# Patient Record
Sex: FEMALE | Race: BLACK OR AFRICAN AMERICAN | NOT HISPANIC OR LATINO | Employment: UNEMPLOYED | URBAN - METROPOLITAN AREA
[De-identification: names, ages, dates, MRNs, and addresses within clinical notes are randomized per-mention and may not be internally consistent; named-entity substitution may affect disease eponyms.]

---

## 2022-03-10 ENCOUNTER — OFFICE VISIT (OUTPATIENT)
Dept: PRIMARY CARE CLINIC | Facility: CLINIC | Age: 42
End: 2022-03-10
Payer: MEDICAID

## 2022-03-10 ENCOUNTER — LAB VISIT (OUTPATIENT)
Dept: PRIMARY CARE CLINIC | Facility: CLINIC | Age: 42
End: 2022-03-10
Payer: MEDICAID

## 2022-03-10 VITALS
HEIGHT: 65 IN | SYSTOLIC BLOOD PRESSURE: 136 MMHG | WEIGHT: 293 LBS | HEART RATE: 97 BPM | DIASTOLIC BLOOD PRESSURE: 60 MMHG | BODY MASS INDEX: 48.82 KG/M2

## 2022-03-10 DIAGNOSIS — E66.01 CLASS 3 SEVERE OBESITY WITH BODY MASS INDEX (BMI) OF 50.0 TO 59.9 IN ADULT, UNSPECIFIED OBESITY TYPE, UNSPECIFIED WHETHER SERIOUS COMORBIDITY PRESENT: ICD-10-CM

## 2022-03-10 DIAGNOSIS — Z00.00 ANNUAL PHYSICAL EXAM: ICD-10-CM

## 2022-03-10 DIAGNOSIS — M25.562 CHRONIC PAIN OF LEFT KNEE: Primary | ICD-10-CM

## 2022-03-10 DIAGNOSIS — R87.610 ASCUS OF CERVIX WITH NEGATIVE HIGH RISK HPV: ICD-10-CM

## 2022-03-10 DIAGNOSIS — N92.0 MENORRHAGIA WITH REGULAR CYCLE: ICD-10-CM

## 2022-03-10 DIAGNOSIS — Z12.31 SCREENING MAMMOGRAM, ENCOUNTER FOR: ICD-10-CM

## 2022-03-10 DIAGNOSIS — G89.29 CHRONIC PAIN OF LEFT KNEE: Primary | ICD-10-CM

## 2022-03-10 LAB
ALBUMIN SERPL BCP-MCNC: 3.3 G/DL (ref 3.5–5.2)
ALP SERPL-CCNC: 32 U/L (ref 55–135)
ALT SERPL W/O P-5'-P-CCNC: 10 U/L (ref 10–44)
ANION GAP SERPL CALC-SCNC: 6 MMOL/L (ref 8–16)
AST SERPL-CCNC: 13 U/L (ref 10–40)
BASOPHILS # BLD AUTO: 0.02 K/UL (ref 0–0.2)
BASOPHILS NFR BLD: 0.4 % (ref 0–1.9)
BILIRUB SERPL-MCNC: 0.3 MG/DL (ref 0.1–1)
BUN SERPL-MCNC: 6 MG/DL (ref 6–20)
CALCIUM SERPL-MCNC: 9.3 MG/DL (ref 8.7–10.5)
CHLORIDE SERPL-SCNC: 102 MMOL/L (ref 95–110)
CHOLEST SERPL-MCNC: 119 MG/DL (ref 120–199)
CHOLEST/HDLC SERPL: 2 {RATIO} (ref 2–5)
CO2 SERPL-SCNC: 30 MMOL/L (ref 23–29)
CREAT SERPL-MCNC: 0.8 MG/DL (ref 0.5–1.4)
DIFFERENTIAL METHOD: ABNORMAL
EOSINOPHIL # BLD AUTO: 0.1 K/UL (ref 0–0.5)
EOSINOPHIL NFR BLD: 1.1 % (ref 0–8)
ERYTHROCYTE [DISTWIDTH] IN BLOOD BY AUTOMATED COUNT: 18.6 % (ref 11.5–14.5)
EST. GFR  (AFRICAN AMERICAN): >60 ML/MIN/1.73 M^2
EST. GFR  (NON AFRICAN AMERICAN): >60 ML/MIN/1.73 M^2
ESTIMATED AVG GLUCOSE: 105 MG/DL (ref 68–131)
GLUCOSE SERPL-MCNC: 91 MG/DL (ref 70–110)
HBA1C MFR BLD: 5.3 % (ref 4–5.6)
HCT VFR BLD AUTO: 31.4 % (ref 37–48.5)
HDLC SERPL-MCNC: 61 MG/DL (ref 40–75)
HDLC SERPL: 51.3 % (ref 20–50)
HGB BLD-MCNC: 8.9 G/DL (ref 12–16)
IMM GRANULOCYTES # BLD AUTO: 0.02 K/UL (ref 0–0.04)
IMM GRANULOCYTES NFR BLD AUTO: 0.4 % (ref 0–0.5)
LDLC SERPL CALC-MCNC: 48.8 MG/DL (ref 63–159)
LYMPHOCYTES # BLD AUTO: 1.6 K/UL (ref 1–4.8)
LYMPHOCYTES NFR BLD: 33.8 % (ref 18–48)
MCH RBC QN AUTO: 21.9 PG (ref 27–31)
MCHC RBC AUTO-ENTMCNC: 28.3 G/DL (ref 32–36)
MCV RBC AUTO: 77 FL (ref 82–98)
MONOCYTES # BLD AUTO: 0.4 K/UL (ref 0.3–1)
MONOCYTES NFR BLD: 8.9 % (ref 4–15)
NEUTROPHILS # BLD AUTO: 2.5 K/UL (ref 1.8–7.7)
NEUTROPHILS NFR BLD: 55.4 % (ref 38–73)
NONHDLC SERPL-MCNC: 58 MG/DL
NRBC BLD-RTO: 0 /100 WBC
PLATELET # BLD AUTO: 293 K/UL (ref 150–450)
PMV BLD AUTO: 11.3 FL (ref 9.2–12.9)
POTASSIUM SERPL-SCNC: 4.1 MMOL/L (ref 3.5–5.1)
PROT SERPL-MCNC: 7.9 G/DL (ref 6–8.4)
RBC # BLD AUTO: 4.07 M/UL (ref 4–5.4)
SODIUM SERPL-SCNC: 138 MMOL/L (ref 136–145)
TRIGL SERPL-MCNC: 46 MG/DL (ref 30–150)
TSH SERPL DL<=0.005 MIU/L-ACNC: 1.15 UIU/ML (ref 0.4–4)
WBC # BLD AUTO: 4.59 K/UL (ref 3.9–12.7)

## 2022-03-10 PROCEDURE — 1159F PR MEDICATION LIST DOCUMENTED IN MEDICAL RECORD: ICD-10-PCS | Mod: CPTII,,, | Performed by: FAMILY MEDICINE

## 2022-03-10 PROCEDURE — 36415 COLL VENOUS BLD VENIPUNCTURE: CPT | Performed by: FAMILY MEDICINE

## 2022-03-10 PROCEDURE — 3078F DIAST BP <80 MM HG: CPT | Mod: CPTII,,, | Performed by: FAMILY MEDICINE

## 2022-03-10 PROCEDURE — 99204 PR OFFICE/OUTPT VISIT, NEW, LEVL IV, 45-59 MIN: ICD-10-PCS | Mod: S$PBB,,, | Performed by: FAMILY MEDICINE

## 2022-03-10 PROCEDURE — 36415 COLL VENOUS BLD VENIPUNCTURE: CPT | Mod: PBBFAC,PN

## 2022-03-10 PROCEDURE — 3008F PR BODY MASS INDEX (BMI) DOCUMENTED: ICD-10-PCS | Mod: CPTII,,, | Performed by: FAMILY MEDICINE

## 2022-03-10 PROCEDURE — 99204 OFFICE O/P NEW MOD 45 MIN: CPT | Mod: S$PBB,,, | Performed by: FAMILY MEDICINE

## 2022-03-10 PROCEDURE — 3008F BODY MASS INDEX DOCD: CPT | Mod: CPTII,,, | Performed by: FAMILY MEDICINE

## 2022-03-10 PROCEDURE — 83036 HEMOGLOBIN GLYCOSYLATED A1C: CPT | Performed by: FAMILY MEDICINE

## 2022-03-10 PROCEDURE — 3075F SYST BP GE 130 - 139MM HG: CPT | Mod: CPTII,,, | Performed by: FAMILY MEDICINE

## 2022-03-10 PROCEDURE — 87389 HIV-1 AG W/HIV-1&-2 AB AG IA: CPT | Performed by: FAMILY MEDICINE

## 2022-03-10 PROCEDURE — 80061 LIPID PANEL: CPT | Performed by: FAMILY MEDICINE

## 2022-03-10 PROCEDURE — 1159F MED LIST DOCD IN RCRD: CPT | Mod: CPTII,,, | Performed by: FAMILY MEDICINE

## 2022-03-10 PROCEDURE — 85025 COMPLETE CBC W/AUTO DIFF WBC: CPT | Performed by: FAMILY MEDICINE

## 2022-03-10 PROCEDURE — 80053 COMPREHEN METABOLIC PANEL: CPT | Performed by: FAMILY MEDICINE

## 2022-03-10 PROCEDURE — 1160F PR REVIEW ALL MEDS BY PRESCRIBER/CLIN PHARMACIST DOCUMENTED: ICD-10-PCS | Mod: CPTII,,, | Performed by: FAMILY MEDICINE

## 2022-03-10 PROCEDURE — 3078F PR MOST RECENT DIASTOLIC BLOOD PRESSURE < 80 MM HG: ICD-10-PCS | Mod: CPTII,,, | Performed by: FAMILY MEDICINE

## 2022-03-10 PROCEDURE — 86803 HEPATITIS C AB TEST: CPT | Performed by: FAMILY MEDICINE

## 2022-03-10 PROCEDURE — 99204 OFFICE O/P NEW MOD 45 MIN: CPT | Mod: PBBFAC,PN | Performed by: FAMILY MEDICINE

## 2022-03-10 PROCEDURE — 99999 PR PBB SHADOW E&M-NEW PATIENT-LVL IV: ICD-10-PCS | Mod: PBBFAC,,, | Performed by: FAMILY MEDICINE

## 2022-03-10 PROCEDURE — 3075F PR MOST RECENT SYSTOLIC BLOOD PRESS GE 130-139MM HG: ICD-10-PCS | Mod: CPTII,,, | Performed by: FAMILY MEDICINE

## 2022-03-10 PROCEDURE — 1160F RVW MEDS BY RX/DR IN RCRD: CPT | Mod: CPTII,,, | Performed by: FAMILY MEDICINE

## 2022-03-10 PROCEDURE — 99999 PR PBB SHADOW E&M-NEW PATIENT-LVL IV: CPT | Mod: PBBFAC,,, | Performed by: FAMILY MEDICINE

## 2022-03-10 PROCEDURE — 84443 ASSAY THYROID STIM HORMONE: CPT | Performed by: FAMILY MEDICINE

## 2022-03-10 RX ORDER — TRANEXAMIC ACID 650 MG/1
1300 TABLET ORAL 3 TIMES DAILY
Qty: 30 TABLET | Refills: 3 | Status: SHIPPED | OUTPATIENT
Start: 2022-03-10 | End: 2022-03-15

## 2022-03-10 RX ORDER — IBUPROFEN 400 MG/1
400 TABLET ORAL EVERY 4 HOURS PRN
Qty: 60 TABLET | Refills: 2 | Status: SHIPPED | OUTPATIENT
Start: 2022-03-10 | End: 2023-06-12

## 2022-03-10 NOTE — PATIENT INSTRUCTIONS
Heavy bleeding  Take one ibuprofen with every meal  Tranexamic acid during your cycle  Progesterone during certain parts of your cycle

## 2022-03-10 NOTE — PROGRESS NOTES
Subjective:       Patient ID: Refugio Segovia is a 41 y.o. female.    Chief Complaint: Annual Exam    40 yo female is presenting to clinic to establish care.    Patient states she lives by herself in [place], she has a lot of family nearby.  She is not currently working.      Patient states she has left eye pain x 4 months.  Patient states eye pain occurs multiple times per week.  Episodes last about 20 minutes.  She has been taking tylenol, which relieves her symptoms.  Patient denies any vision changes, floaters, flashing light sensation.      Patient also has left knee pain x 3 months.  Patient states it started in her right knee, has moved to her left.  She denies any new injury to the knee.  She states the pain is worsened when she walks a lot.  She states it feels like it gives out on her.  She has been taking tylenol for the pain. She denies any numbness or tingling.    Menorrhagia - , Patient reports that her menstrual periods have been increasing in duration, and have been more painful.  Patient has a history of abnormal uterine bleeding, she had an EMB in 2017 which showed normal results.  Patient states her periods used to last 5 days, they have been increasing in duration over the last 3 years.  Patient states her last 2 periods have vhpita90 days. LMP: 22. Patient also reports that her adominal cramps are worsening. She is passing clots during her period, which is normal for her, but she states the clots look larger.  Her last OB/GYN appointment was before the pandemic.    She would like routine labs today.         Review of Systems   Constitutional: Negative for chills and fever.   HENT: Negative for ear pain and sore throat.    Eyes: Positive for pain (left eye pain occasionally).   Respiratory: Negative for cough and shortness of breath.    Cardiovascular: Negative for chest pain.   Gastrointestinal: Negative for abdominal pain, constipation, diarrhea, nausea and vomiting.    Genitourinary: Positive for menstrual problem (bleeding for 9-10 days). Negative for dysuria, menstrual irregularity and nocturia.   Musculoskeletal: Positive for arthralgias (left knee pain). Negative for back pain and neck pain.   Neurological: Negative for dizziness, light-headedness and headaches.         Objective:      Physical Exam  Constitutional:       Appearance: She is well-developed and well-groomed. She is obese.   HENT:      Head: Normocephalic and atraumatic.   Eyes:      General:         Right eye: No discharge.         Left eye: No discharge.      Extraocular Movements: Extraocular movements intact.      Conjunctiva/sclera: Conjunctivae normal.   Cardiovascular:      Rate and Rhythm: Normal rate and regular rhythm.      Heart sounds: Normal heart sounds. No murmur heard.    No friction rub. No gallop.   Pulmonary:      Effort: Pulmonary effort is normal.      Breath sounds: Normal breath sounds. No wheezing, rhonchi or rales.   Abdominal:      General: Bowel sounds are normal.      Palpations: Abdomen is soft.   Musculoskeletal:      Cervical back: Normal range of motion and neck supple.      Right knee: No swelling. Normal range of motion.      Left knee: No swelling. Normal range of motion. Tenderness present.   Skin:     General: Skin is warm and dry.   Neurological:      General: No focal deficit present.      Mental Status: She is alert and oriented to person, place, and time.   Psychiatric:         Mood and Affect: Mood normal.         Behavior: Behavior normal.         Thought Content: Thought content normal.         Judgment: Judgment normal.         Assessment:       Problem List Items Addressed This Visit        Endocrine    Obesity      Other Visit Diagnoses     Chronic pain of left knee    -  Primary    Relevant Orders    Ambulatory referral/consult to Physical/Occupational Therapy    X-Ray Knee Complete 4 or More Views Left    Menorrhagia with regular cycle        Relevant Medications     ibuprofen (ADVIL,MOTRIN) 400 MG tablet    tranexamic acid (LYSTEDA) 650 mg tablet    Other Relevant Orders    US Pelvis Complete Non OB    ASCUS of cervix with negative high risk HPV        Annual physical exam        Relevant Orders    CBC Auto Differential    Lipid Panel    Comprehensive Metabolic Panel    TSH    Hemoglobin A1C    Hepatitis C Antibody    HIV 1/2 Ag/Ab (4th Gen)    Screening mammogram, encounter for        Relevant Orders    Mammo Digital Screening Bilat w/ Jero          Plan:           1. Chronic pain of left knee  - Patient educated that weight loss would be beneficial for knee pain and overall health  - Ambulatory referral/consult to Physical/Occupational Therapy; Future  - X-Ray Knee Complete 4 or More Views Left; Future    2. Menorrhagia with regular cycle  - likely due to anovulatory bleeding, has failed a trial of daily provera in the past and does not want an IUD.  Previous pelvic ultrasound with endometrial stripe at 2.5 cm with mild heterogeneity, though maybe in secretory phase of menstruation.  EMB showing proliferative endometrium, negative for hyperplasia or malignancy  -  Patient to follow up in clinic for well woman exam  - for now recommend NSAIDs and tranexamic acid, discussed cyclic Provera which patient declined at this  - US Pelvis Complete Non OB; Future  - ibuprofen (ADVIL,MOTRIN) 400 MG tablet; Take 1 tablet (400 mg total) by mouth every 4 (four) hours as needed for Other (Take one tablet with every meal during heavy bleeding).  Dispense: 60 tablet; Refill: 2  - tranexamic acid (LYSTEDA) 650 mg tablet; Take 2 tablets (1,300 mg total) by mouth 3 (three) times daily. for 5 days  Dispense: 30 tablet; Refill: 3    3. ASCUS of cervix with negative high risk HPV  - Patient declined pap-smear today, would like to wait and follow up in clinic for well woman exam     4. Annual physical exam  - CBC Auto Differential; Future  - Lipid Panel; Future  - Comprehensive Metabolic Panel;  Future  - TSH; Future  - Hemoglobin A1C; Future  - Hepatitis C Antibody; Future  - HIV 1/2 Ag/Ab (4th Gen); Future    5. Screening mammogram, encounter for  - Mammo Digital Screening Bilat w/ Jero; Future    6. Class 3 severe obesity with body mass index (BMI) of 50.0 to 59.9 in adult, unspecified obesity type, unspecified whether serious comorbidity present  - Patient educated on the benefits of overall health with weight loss    F/u soon for [employer]    I saw and evaluated the patient and discussed the care with  WILLIAM Rey. I agree with the findings and plan as documented in the note above.      Capo Nieto MD

## 2022-03-12 LAB — HIV 1+2 AB+HIV1 P24 AG SERPL QL IA: NEGATIVE

## 2022-03-13 LAB — HCV AB SERPL QL IA: NEGATIVE

## 2022-03-14 ENCOUNTER — HOSPITAL ENCOUNTER (OUTPATIENT)
Dept: RADIOLOGY | Facility: HOSPITAL | Age: 42
Discharge: HOME | End: 2022-03-14
Attending: FAMILY MEDICINE
Payer: MEDICAID

## 2022-03-14 ENCOUNTER — TELEPHONE (OUTPATIENT)
Dept: PRIMARY CARE CLINIC | Facility: CLINIC | Age: 42
End: 2022-03-14
Payer: MEDICAID

## 2022-03-14 VITALS — BODY MASS INDEX: 48.82 KG/M2 | HEIGHT: 65 IN | WEIGHT: 293 LBS

## 2022-03-14 DIAGNOSIS — M25.562 CHRONIC PAIN OF LEFT KNEE: ICD-10-CM

## 2022-03-14 DIAGNOSIS — G89.29 CHRONIC PAIN OF LEFT KNEE: ICD-10-CM

## 2022-03-14 DIAGNOSIS — Z12.31 SCREENING MAMMOGRAM, ENCOUNTER FOR: ICD-10-CM

## 2022-03-14 PROCEDURE — 77063 BREAST TOMOSYNTHESIS BI: CPT | Mod: TC,PN

## 2022-03-14 PROCEDURE — 77063 MAMMO DIGITAL SCREENING BILAT WITH TOMO: ICD-10-PCS | Mod: 26,,, | Performed by: RADIOLOGY

## 2022-03-14 PROCEDURE — 73564 X-RAY EXAM KNEE 4 OR MORE: CPT | Mod: 26,LT,, | Performed by: RADIOLOGY

## 2022-03-14 PROCEDURE — 73564 XR KNEE ORTHO LEFT WITH FLEXION: ICD-10-PCS | Mod: 26,LT,, | Performed by: RADIOLOGY

## 2022-03-14 PROCEDURE — 73562 X-RAY EXAM OF KNEE 3: CPT | Mod: 26,RT,, | Performed by: RADIOLOGY

## 2022-03-14 PROCEDURE — 73562 XR KNEE ORTHO LEFT WITH FLEXION: ICD-10-PCS | Mod: 26,RT,, | Performed by: RADIOLOGY

## 2022-03-14 PROCEDURE — 73562 X-RAY EXAM OF KNEE 3: CPT | Mod: TC,PN,RT

## 2022-03-14 PROCEDURE — 77063 BREAST TOMOSYNTHESIS BI: CPT | Mod: 26,,, | Performed by: RADIOLOGY

## 2022-03-14 PROCEDURE — 77067 MAMMO DIGITAL SCREENING BILAT WITH TOMO: ICD-10-PCS | Mod: 26,,, | Performed by: RADIOLOGY

## 2022-03-14 PROCEDURE — 77067 SCR MAMMO BI INCL CAD: CPT | Mod: TC,PN

## 2022-03-14 PROCEDURE — 77067 SCR MAMMO BI INCL CAD: CPT | Mod: 26,,, | Performed by: RADIOLOGY

## 2022-03-14 NOTE — TELEPHONE ENCOUNTER
Spoke to pt and informed her that the x-ray of her knees shows some mild arthritis; otherwise it looks good. Pt verbalized understanding.       ----- Message from Capo Nieto MD sent at 3/14/2022 11:48 AM CDT -----  Please let patient know that the x-ray of her knees show some mild arthritis.  Otherwise looks good

## 2022-03-15 RX ORDER — FERROUS SULFATE 325(65) MG
325 TABLET, DELAYED RELEASE (ENTERIC COATED) ORAL EVERY OTHER DAY
Qty: 60 TABLET | Refills: 1 | Status: SHIPPED | OUTPATIENT
Start: 2022-03-15 | End: 2023-07-19 | Stop reason: SDUPTHER

## 2022-03-23 ENCOUNTER — TELEPHONE (OUTPATIENT)
Dept: PRIMARY CARE CLINIC | Facility: CLINIC | Age: 42
End: 2022-03-23
Payer: MEDICAID

## 2022-03-23 ENCOUNTER — HOSPITAL ENCOUNTER (OUTPATIENT)
Dept: RADIOLOGY | Facility: HOSPITAL | Age: 42
Discharge: HOME | End: 2022-03-23
Attending: FAMILY MEDICINE
Payer: MEDICAID

## 2022-03-23 DIAGNOSIS — N92.0 MENORRHAGIA WITH REGULAR CYCLE: ICD-10-CM

## 2022-03-23 PROCEDURE — 76830 TRANSVAGINAL US NON-OB: CPT | Mod: TC

## 2022-03-23 PROCEDURE — 76856 US EXAM PELVIC COMPLETE: CPT | Mod: 26,,, | Performed by: RADIOLOGY

## 2022-03-23 PROCEDURE — 76856 US PELVIS COMP WITH TRANSVAG NON-OB (XPD): ICD-10-PCS | Mod: 26,,, | Performed by: RADIOLOGY

## 2022-03-23 PROCEDURE — 76830 US PELVIS COMP WITH TRANSVAG NON-OB (XPD): ICD-10-PCS | Mod: 26,,, | Performed by: RADIOLOGY

## 2022-03-23 PROCEDURE — 76830 TRANSVAGINAL US NON-OB: CPT | Mod: 26,,, | Performed by: RADIOLOGY

## 2022-03-23 NOTE — TELEPHONE ENCOUNTER
----- Message from Capo Nieto MD sent at 3/23/2022 10:06 AM CDT -----  Please let patient know that her ultrasound of her pelvis looks normal.  Her uterus looks good and her ovaries look good

## 2023-01-10 ENCOUNTER — HOSPITAL ENCOUNTER (EMERGENCY)
Facility: HOSPITAL | Age: 43
Discharge: HOME | End: 2023-01-10
Attending: EMERGENCY MEDICINE
Payer: MEDICAID

## 2023-01-10 VITALS
WEIGHT: 293 LBS | TEMPERATURE: 99 F | HEIGHT: 65 IN | RESPIRATION RATE: 16 BRPM | OXYGEN SATURATION: 99 % | DIASTOLIC BLOOD PRESSURE: 101 MMHG | BODY MASS INDEX: 48.82 KG/M2 | SYSTOLIC BLOOD PRESSURE: 140 MMHG | HEART RATE: 78 BPM

## 2023-01-10 DIAGNOSIS — E66.01 CLASS 3 SEVERE OBESITY WITH BODY MASS INDEX (BMI) OF 50.0 TO 59.9 IN ADULT, UNSPECIFIED OBESITY TYPE, UNSPECIFIED WHETHER SERIOUS COMORBIDITY PRESENT: ICD-10-CM

## 2023-01-10 DIAGNOSIS — M79.605 BILATERAL LEG PAIN: Primary | ICD-10-CM

## 2023-01-10 DIAGNOSIS — R03.0 ELEVATED BLOOD PRESSURE READING IN OFFICE WITHOUT DIAGNOSIS OF HYPERTENSION: ICD-10-CM

## 2023-01-10 DIAGNOSIS — M25.562 ACUTE BILATERAL KNEE PAIN: ICD-10-CM

## 2023-01-10 DIAGNOSIS — M25.561 ACUTE BILATERAL KNEE PAIN: ICD-10-CM

## 2023-01-10 DIAGNOSIS — M79.604 BILATERAL LEG PAIN: Primary | ICD-10-CM

## 2023-01-10 DIAGNOSIS — E66.01 CLASS 3 SEVERE OBESITY WITHOUT SERIOUS COMORBIDITY IN ADULT, UNSPECIFIED BMI, UNSPECIFIED OBESITY TYPE: ICD-10-CM

## 2023-01-10 LAB
HCV AB SERPL QL IA: NORMAL
HIV 1+2 AB+HIV1 P24 AG SERPL QL IA: NORMAL

## 2023-01-10 PROCEDURE — 99283 PR EMERGENCY DEPT VISIT,LEVEL III: ICD-10-PCS | Mod: ,,, | Performed by: EMERGENCY MEDICINE

## 2023-01-10 PROCEDURE — 99283 EMERGENCY DEPT VISIT LOW MDM: CPT

## 2023-01-10 PROCEDURE — 87389 HIV-1 AG W/HIV-1&-2 AB AG IA: CPT | Performed by: PHYSICIAN ASSISTANT

## 2023-01-10 PROCEDURE — 99283 EMERGENCY DEPT VISIT LOW MDM: CPT | Mod: ,,, | Performed by: EMERGENCY MEDICINE

## 2023-01-10 PROCEDURE — 86803 HEPATITIS C AB TEST: CPT | Performed by: PHYSICIAN ASSISTANT

## 2023-01-10 RX ORDER — MELOXICAM 15 MG/1
15 TABLET ORAL DAILY
Qty: 21 TABLET | Refills: 0 | Status: SHIPPED | OUTPATIENT
Start: 2023-01-10 | End: 2023-01-31

## 2023-01-10 NOTE — ED PROVIDER NOTES
Encounter Date: 1/10/2023    SCRIBE #1 NOTE: I, Eleanor Mcdonald, am scribing for, and in the presence of,  Agustin Medina III, MD. I have scribed the following portions of the note - Other sections scribed: HPI, ROS, PE.     History     Chief Complaint   Patient presents with    Knee Pain     R leg/knee pain, states has lump on side     Time patient was seen by the provider: 3:31 PM      The patient is a 42 y.o. female with no significant past medical history who presents to the ED with a complaint of bilateral knee pain onset several months ago. Associated symptoms include bilateral lower extremity pain and thigh pain. She endorses numbness to her left lower extremity while standing. 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 She works 8-9 hours per shift. After her shifts, she takes Tylenol. The patient denies any trauma or falls. In the morning, her pain subsides. However, her pain returns at night.  Her symptoms are progressively worse. Therefore, she came to the ED. She also has a lump located on her left ankle. The patient does not have a PCP. She denies a history of HTN, HLD, and DM.     The history is provided by the patient and medical records. No  was used.   Review of patient's allergies indicates:  No Known Allergies  History reviewed. No pertinent past medical history.  Past Surgical History:   Procedure Laterality Date    BREAST CYST EXCISION Left 1987     Family History   Problem Relation Age of Onset    Heart disease Mother     Diabetes Father      Social History     Tobacco Use    Smoking status: Never    Smokeless tobacco: Never   Substance Use Topics    Alcohol use: Never    Drug use: Never     Review of Systems   Constitutional:  Negative for fever.   HENT:  Negative for sore throat.    Respiratory:  Negative for shortness of breath.    Cardiovascular:  Negative for chest pain.   Gastrointestinal:  Negative for nausea.    Genitourinary:  Negative for dysuria.   Musculoskeletal:  Positive for arthralgias (bilateral knee pain). Negative for back pain.        + Bilateral lower extremity pain, specifically thigh pain.   Skin:  Negative for rash.        + Lump (left ankle)   Neurological:  Positive for numbness (left thigh while standing). Negative for weakness.   Hematological:  Does not bruise/bleed easily.     Physical Exam     Initial Vitals [01/10/23 1341]   BP Pulse Resp Temp SpO2   (!) 161/92 92 18 98.8 °F (37.1 °C) 99 %      MAP       --         Physical Exam    Nursing note and vitals reviewed.    Appearance: Obese, but comfortable.  Skin: No rashes seen.  Good turgor.  No abrasions.  No ecchymoses.  Eyes: No conjunctival injection.  ENT: Oropharynx clear.    Chest: Clear to auscultation bilaterally.  Good air movement.  No wheezes.  No rhonchi.  Cardiovascular: Regular rate and rhythm.  No murmurs. No gallops. No rubs.  Abdomen: Soft.  Not distended.  Nontender.  No guarding.  No rebound.  Musculoskeletal: Non-focal tenderness to bilateral lower extremities with swelling around the bilateral lateral malleolus. Good range of motion all joints.  No deformities.  Neck supple.  No meningismus.  Neurologic: Motor intact.  Sensation intact.  Cerebellar intact.  Cranial nerves intact.  Mental Status:  Alert and oriented x 3.  Appropriate, conversant.      ED Course   Procedures  Labs Reviewed   HIV 1 / 2 ANTIBODY    Narrative:     Release to patient->Immediate   HEPATITIS C ANTIBODY    Narrative:     Release to patient->Immediate          Imaging Results    None          Medications - No data to display  Medical Decision Making:   History:   Old Medical Records: I decided to obtain old medical records.  ED Management:  Patient arrives secondary to bilateral lower extremity pain.  Patient works as a  and a fast food restaurant, where she is on her feet for several hours throughout the day.  She is morbidly obese, and states she  denies any trauma to her legs, but just feels like the pain has been worsening.  Patient exam was unremarkable for any focal tenderness, and I had a long talk with her in regards to weight control, exercise, and the use of ice to help alleviate her pain after working all day.  Patient wants to try to lose weight, additionally she may benefit from physical therapy for flexibility purposes as well, I have referred her to primary care, bariatric surgery, and physical therapy.  She will take anti-inflammatories and she is discharged in stable condition.        Scribe Attestation:   Scribe #1: I performed the above scribed service and the documentation accurately describes the services I performed. I attest to the accuracy of the note.                   Clinical Impression:   Final diagnoses:  [M79.604, M79.605] Bilateral leg pain (Primary)  [M25.561, M25.562] Acute bilateral knee pain  [E66.01] Class 3 severe obesity without serious comorbidity in adult, unspecified BMI, unspecified obesity type  [R03.0] Elevated blood pressure reading in office without diagnosis of hypertension  [E66.01, Z68.43] Class 3 severe obesity with body mass index (BMI) of 50.0 to 59.9 in adult, unspecified obesity type, unspecified whether serious comorbidity present        ED Disposition Condition    Discharge Stable          ED Prescriptions       Medication Sig Dispense Start Date End Date Auth. Provider    meloxicam (MOBIC) 15 MG tablet Take 1 tablet (15 mg total) by mouth once daily. Prn pain for 21 days 21 tablet 1/10/2023 1/31/2023 Agustin Medina III, MD          Follow-up Information    None          Agustin Medina III, MD  01/11/23 2034

## 2023-01-10 NOTE — ED NOTES
"The patient came to the er with c/o pain in both legs x several weeks. Numbness in left thigh area after standing at work over the last few days. No injury or trauma. Patient is a  and stands at work for long periods of time. Tylenol and ibuprofen gave relief at first but "not helping too much now". Pt c/o knot to left lower leg x night before last. Tender to touch.   "

## 2023-01-11 ENCOUNTER — TELEPHONE (OUTPATIENT)
Dept: BARIATRICS | Facility: CLINIC | Age: 43
End: 2023-01-11
Payer: MEDICAID

## 2023-01-13 ENCOUNTER — CLINICAL SUPPORT (OUTPATIENT)
Dept: REHABILITATION | Facility: HOSPITAL | Age: 43
End: 2023-01-13
Attending: EMERGENCY MEDICINE
Payer: MEDICAID

## 2023-01-13 DIAGNOSIS — M79.605 BILATERAL LEG PAIN: ICD-10-CM

## 2023-01-13 DIAGNOSIS — M79.604 BILATERAL LEG PAIN: ICD-10-CM

## 2023-01-13 DIAGNOSIS — M25.562 ACUTE BILATERAL KNEE PAIN: ICD-10-CM

## 2023-01-13 DIAGNOSIS — M22.2X1 PATELLOFEMORAL PAIN SYNDROME OF BOTH KNEES: ICD-10-CM

## 2023-01-13 DIAGNOSIS — M25.561 ACUTE BILATERAL KNEE PAIN: ICD-10-CM

## 2023-01-13 DIAGNOSIS — M25.661 IMPAIRED RANGE OF MOTION OF RIGHT KNEE: ICD-10-CM

## 2023-01-13 DIAGNOSIS — M22.2X2 PATELLOFEMORAL PAIN SYNDROME OF BOTH KNEES: ICD-10-CM

## 2023-01-13 PROCEDURE — 97161 PT EVAL LOW COMPLEX 20 MIN: CPT | Mod: PO

## 2023-01-13 NOTE — PLAN OF CARE
"[facility]   Physical Therapy Initial Evaluation     Date: 1/13/2023   Name: Refugio Segovia  Clinic Number: [id number]    Therapy Diagnosis:   Encounter Diagnoses   Name Primary?    Bilateral leg pain     Acute bilateral knee pain     Impaired range of motion of right knee     Patellofemoral pain syndrome of both knees      Physician: Agustin Medina III,*    Physician Orders: PT Eval and Treat   Medical Diagnosis from Referral:   M79.604,M79.605 (ICD-10-CM) - Bilateral leg pain   M25.561,M25.562 (ICD-10-CM) - Acute bilateral knee pain     Evaluation Date: 1/13/2023  Authorization Period Expiration: 1/10/24  Plan of Care Expiration: 2/24/23  Progress Note Due: 2/13/23  Visit # / Visits authorized: 1/ 1   FOTO: 1/3    Precautions: Standard     Time In: 920 AM  Time Out: 9:48 AM  Total Appointment Time (timed & untimed codes): 28 minutes      SUBJECTIVE     Date of onset: 3 weeks ago     History of current condition - Refugio reports: she stands at work she is a  (for the past 23 years). She has been having pain in both legs in the knees (right is worse than left). This past week she endorses numbness with standing (in the left hip and thigh). The pain in the knees has been going on about 3 weeks.     Falls: denies recent falls     Imaging, see chart     Prior Therapy: none  Social History: lives in single story home, lives with sister   Occupation:    Prior Level of Function: no previous knee issues   Current Level of Function: independent but limited in prolonged standing and walking long distances     Pain:  Current 7/10, worst 10/10, best 4/10   Location: bilateral knees (right knee worse than left)  Description: Throbbing and Tingling  Aggravating Factors: Standing and Walking, standing up from low surface   Easing Factors: moving and massaging     Patients goals: "try to get some of this weight down" "try to get this knee better"     Medical History:   No past " medical history on file.    Surgical History:   Refugio Segovia  has a past surgical history that includes Breast cyst excision (Left, 1987).    Medications:   Refugio has a current medication list which includes the following prescription(s): ferrous sulfate, ibuprofen, and meloxicam.    Allergies:   Review of patient's allergies indicates:  No Known Allergies       OBJECTIVE     Observation: enters clinic without assistive device     Posture: genu valgus bilaterally     Gait: impaired heel strike, genu vaglum      Active ROM: (measured in degrees)   Knee    Right 6-110   Left 0-120     Sensation: intact     Lower Extremity Strength (graded 0-5 out of 5)    Right LE Left LE   Hip flexion: 5/5 5/5   Knee extension:  4+/5 5/5   Ankle dorsiflexion: 5/5 5/5   Posterior fibers of Gluteus Medius 4/5 4+/5   Hip extension: 4/5 4/5   Knee flexion:  5/5 5/5   Ankle plantarflexion: 5/5 5/5         Function:  Double Leg squat: pain, impaired form  Sit <--> Stand:I   Bed Mobility: I      Palpation:   TTP at right patella globally       Girth Measurement Joint line superior to joint line inferior to joint line   Left  cm  cm  cm   Right  cm  cm  cm       Five time sit to stand: 33 seconds from low mat without UE assistance       Limitation/Restriction for FOTO upper leg Survey    Therapist reviewed FOTO scores for Refugio Segovia on 1/13/2023.   FOTO documents entered into [employer] - see Media section.    Limitation Score: 49%         TREATMENT     N/a    PATIENT EDUCATION AND HOME EXERCISES     Education provided:   - PT role and POC    ASSESSMENT     Refugio is a 42 y.o. female referred to outpatient Physical Therapy with a medical diagnosis of acute bilateral knee pain. Patient presents with signs and symptoms of patellofemoral pain syndrome. Pt with pain with ureña's sign test, pain with squatting, and pain with rising from seated position after sitting for extended periods of time. Pt presents with right knee more  symptomatic than left, also right knee ROM moreso impaired as compared to left.   Patient prognosis is Good.   Patient will benefit from skilled outpatient Physical Therapy to address the deficits stated above and in the chart below, provide patient /family education, and to maximize patientt's level of independence.     Plan of care discussed with patient: Yes  Patient's spiritual, cultural and educational needs considered and patient is agreeable to the plan of care and goals as stated below:     Anticipated Barriers for therapy: obesity, scheduling    Medical Necessity is demonstrated by the following  History  Co-morbidities and personal factors that may impact the plan of care Co-morbidities:   obesity    Personal Factors:   no deficits     low   Examination  Body Structures and Functions, activity limitations and participation restrictions that may impact the plan of care Body Regions:   lower extremities    Body Systems:    gross symmetry  ROM  strength  gross coordinated movement  balance  gait  transfers  transitions  motor control  motor learning    Participation Restrictions:   none    Activity limitations:   Learning and applying knowledge  no deficits    General Tasks and Commands  no deficits    Communication  no deficits    Mobility  walking    Self care  no deficits    Domestic Life  no deficits    Interactions/Relationships  no deficits    Life Areas  employment    Community and Social Life  community life  recreation and leisure         low   Clinical Presentation stable and uncomplicated low   Decision Making/ Complexity Score: low     Goals:      Goals: Short Term Goals: 3 weeks   1. Pt will report </= 5/10 pain at worst in bilateral knees to improve tolerance to exercise.   2. Pt will improve MMT B LE musculature by 1/2 grade to improve function.   3. Pt will be independent with HEP to promote return to recreational activities.   4. Pt to tolerate 10 mins of walking on treadmill with proper heel  to toe gait and no pain complaints in order to improve walking tolerance/endurance.       Long Term Goals: 6 weeks  1. Pt will report </= 3/10 pain in knees to improve tolerance to exercise .  2. Pt will improve MMT B LE musculature by 1 muscle grade to improve functional mobility.  3. Pt will be independent with HEP progressions to promote return to recreational activities.  4. Pt to report no pain with stair navigation in order to improve functional mobility.   5. Pt to tolerate walking 15 minutes on treadmill with proper heel to toe gait and no pain complaints in order to improve walking tolerance/endurance.    6.  Pt improve impaired right knee ROM to WNL in all planes to improve mobility for normal movement patterns.       PLAN   Plan of care Certification: 1/13/2023 to 2/24/23.    Outpatient Physical Therapy 2 times weekly for 6 weeks to include the following interventions: Gait Training, Manual Therapy, Moist Heat/ Ice, Neuromuscular Re-ed, Patient Education, Self Care, Therapeutic Activities, and Therapeutic Exercise.     Sonia Zhu, PT      I CERTIFY THE NEED FOR THESE SERVICES FURNISHED UNDER THIS PLAN OF TREATMENT AND WHILE UNDER MY CARE   Physician's comments:     Physician's Signature: ___________________________________________________

## 2023-01-18 ENCOUNTER — CLINICAL SUPPORT (OUTPATIENT)
Dept: REHABILITATION | Facility: HOSPITAL | Age: 43
End: 2023-01-18
Attending: EMERGENCY MEDICINE
Payer: MEDICAID

## 2023-01-18 DIAGNOSIS — M25.661 IMPAIRED RANGE OF MOTION OF RIGHT KNEE: Primary | ICD-10-CM

## 2023-01-18 DIAGNOSIS — M22.2X1 PATELLOFEMORAL PAIN SYNDROME OF BOTH KNEES: ICD-10-CM

## 2023-01-18 DIAGNOSIS — M22.2X2 PATELLOFEMORAL PAIN SYNDROME OF BOTH KNEES: ICD-10-CM

## 2023-01-18 PROCEDURE — 97110 THERAPEUTIC EXERCISES: CPT | Mod: PO,CQ

## 2023-01-18 NOTE — PROGRESS NOTES
"[facility]   Physical Therapy Treatment Note     Name: Refugio Segovia  Clinic Number: [id number]    Therapy Diagnosis: No diagnosis found.  Physician: Agustin Medina III,*    Visit Date: 1/18/2023  Physician Orders: PT Eval and Treat   Medical Diagnosis from Referral:   M79.604,M79.605 (ICD-10-CM) - Bilateral leg pain   M25.561,M25.562 (ICD-10-CM) - Acute bilateral knee pain      Evaluation Date: 1/13/2023  Authorization Period Expiration: 1/10/24  Plan of Care Expiration: 2/24/23  Progress Note Due: 2/13/23  Visit # / Visits authorized: 2/20  FOTO: 1/3     Precautions: Standard     PTA Visit #: 1/5     Time In: 8:50 AM  Time Out: 9:30 AM  Total Billable Time: 40 minutes    SUBJECTIVE     Pt reports: her knees are achy today.  She was not compliant with home exercise program since not provided at her IE.  Response to previous treatment: n/a  Functional change: n/a    Pain: 8/10  Location: bilateral knees      OBJECTIVE     Objective Measures updated at progress report unless specified.     Treatment     Refugio received the treatments listed below:      therapeutic exercises to develop strength, endurance, ROM, and posture for 40 minutes including:  Nu-step 5'  Heel slides (supine w/ towel) 20x ea LE  Quad sets 5" 10x ea   Bridge 2x10  SLR 1x10 ea  SLL 1x10 ea  SAQ 2x10 ea LE, 3" hold  Supine clam, GTB 2x10    Patient Education and Home Exercises     Home Exercises Provided and Patient Education Provided     Education provided:   - in updated HEP    Written Home Exercises Provided: yes. Exercises were reviewed and Refugio was able to demonstrate them prior to the end of the session.  Refugio demonstrated good  understanding of the education provided. See EMR under Patient Instructions for exercises provided during therapy sessions    ASSESSMENT     Patient with mild improvement in achy symptoms upon completion of her session this date, but fatigues rather quickly during exercise " due to LE weakness and endurance deficits. She would benefit from further skilled treatment to maximize her overall function.     Refugio Is progressing well towards her goals.   Pt prognosis is Good.     Pt will continue to benefit from skilled outpatient physical therapy to address the deficits listed in the problem list box on initial evaluation, provide pt/family education and to maximize pt's level of independence in the home and community environment.     Pt's spiritual, cultural and educational needs considered and pt agreeable to plan of care and goals.     Anticipated barriers to physical therapy: obesity, scheduling    Goals:   Short Term Goals: 3 weeks   1. Pt will report </= 5/10 pain at worst in bilateral knees to improve tolerance to exercise.   2. Pt will improve MMT B LE musculature by 1/2 grade to improve function.   3. Pt will be independent with HEP to promote return to recreational activities.   4. Pt to tolerate 10 mins of walking on treadmill with proper heel to toe gait and no pain complaints in order to improve walking tolerance/endurance.       Long Term Goals: 6 weeks  1. Pt will report </= 3/10 pain in knees to improve tolerance to exercise .  2. Pt will improve MMT B LE musculature by 1 muscle grade to improve functional mobility.  3. Pt will be independent with HEP progressions to promote return to recreational activities.  4. Pt to report no pain with stair navigation in order to improve functional mobility.   5. Pt to tolerate walking 15 minutes on treadmill with proper heel to toe gait and no pain complaints in order to improve walking tolerance/endurance.    6.  Pt improve impaired right knee ROM to WNL in all planes to improve mobility for normal movement patterns.     PLAN     Continue to progress within PLAN OF CARE to address pt deficits and complaints.     Amarilis Aguirre, PTA

## 2023-01-20 ENCOUNTER — CLINICAL SUPPORT (OUTPATIENT)
Dept: REHABILITATION | Facility: HOSPITAL | Age: 43
End: 2023-01-20
Attending: EMERGENCY MEDICINE
Payer: MEDICAID

## 2023-01-20 DIAGNOSIS — M22.2X1 PATELLOFEMORAL PAIN SYNDROME OF BOTH KNEES: ICD-10-CM

## 2023-01-20 DIAGNOSIS — M25.661 IMPAIRED RANGE OF MOTION OF RIGHT KNEE: Primary | ICD-10-CM

## 2023-01-20 DIAGNOSIS — M22.2X2 PATELLOFEMORAL PAIN SYNDROME OF BOTH KNEES: ICD-10-CM

## 2023-01-20 PROCEDURE — 97110 THERAPEUTIC EXERCISES: CPT | Mod: PO,CQ

## 2023-01-20 NOTE — PROGRESS NOTES
"[facility]   Physical Therapy Treatment Note     Name: Refugio Segovia  Clinic Number: [id number]    Therapy Diagnosis:   Encounter Diagnoses   Name Primary?    Impaired range of motion of right knee Yes    Patellofemoral pain syndrome of both knees      Physician: Agustin Medina III,*    Visit Date: 1/20/2023  Physician Orders: PT Eval and Treat   Medical Diagnosis from Referral:   M79.604,M79.605 (ICD-10-CM) - Bilateral leg pain   M25.561,M25.562 (ICD-10-CM) - Acute bilateral knee pain      Evaluation Date: 1/13/2023  Authorization Period Expiration: 1/10/24  Plan of Care Expiration: 2/24/23  Progress Note Due: 2/13/23  Visit # / Visits authorized: 3/20  FOTO: 1/3     Precautions: Standard     PTA Visit #: 2/5     Time In: 8:00 AM  Time Out: 8:38 AM  Total Billable Time: 38 minutes    SUBJECTIVE     Pt reports: her right knee is still particularly bothersome.   She was compliant with home exercise program since not provided at her IE.  Response to previous treatment: fatigue   Functional change: ongoing    Pain: 8/10  Location: right knee    OBJECTIVE     Objective Measures updated at progress report unless specified.     Treatment     Refugio received the treatments listed below:      therapeutic exercises to develop strength, endurance, ROM, and posture for 38 minutes including:  Nu-step 5' level 2  Heel slides (supine w/ towel) 20x ea LE  Quad sets 5" 10x ea   Bridge 2x10  SLR 1x10 ea  SLL 1x10 ea  SAQ 2x10 ea LE, 3" hold  Supine clam, GTB 2x10  Standing hip flexor (S) 20" x 2  3 way glute 5x ea    Patient Education and Home Exercises     Home Exercises Provided and Patient Education Provided     Education provided:   - in updated HEP    Written Home Exercises Provided: yes. Exercises were reviewed and Refugio was able to demonstrate them prior to the end of the session.  Refugio demonstrated good  understanding of the education provided. See EMR under Patient Instructions for " exercises provided during therapy sessions    ASSESSMENT   Patient with mild increase in symptoms at the right knee with closed chain activities, but limit repetitions in order to prevent significant complaints of pain following treatment. Instructed patient in a hip flexor stretch due to her flexed posture and to hopefully address knee extension deficits with this activity as well.     Refugio Is progressing well towards her goals.   Pt prognosis is Good.     Pt will continue to benefit from skilled outpatient physical therapy to address the deficits listed in the problem list box on initial evaluation, provide pt/family education and to maximize pt's level of independence in the home and community environment.     Pt's spiritual, cultural and educational needs considered and pt agreeable to plan of care and goals.     Anticipated barriers to physical therapy: obesity, scheduling    Goals:   Short Term Goals: 3 weeks   1. Pt will report </= 5/10 pain at worst in bilateral knees to improve tolerance to exercise.   2. Pt will improve MMT B LE musculature by 1/2 grade to improve function.   3. Pt will be independent with HEP to promote return to recreational activities.   4. Pt to tolerate 10 mins of walking on treadmill with proper heel to toe gait and no pain complaints in order to improve walking tolerance/endurance.       Long Term Goals: 6 weeks  1. Pt will report </= 3/10 pain in knees to improve tolerance to exercise .  2. Pt will improve MMT B LE musculature by 1 muscle grade to improve functional mobility.  3. Pt will be independent with HEP progressions to promote return to recreational activities.  4. Pt to report no pain with stair navigation in order to improve functional mobility.   5. Pt to tolerate walking 15 minutes on treadmill with proper heel to toe gait and no pain complaints in order to improve walking tolerance/endurance.    6.  Pt improve impaired right knee ROM to WNL in all planes to improve  mobility for normal movement patterns.     PLAN     Continue to progress within PLAN OF CARE to address pt deficits and complaints.     Amarilis Aguirre, PTA

## 2023-01-27 ENCOUNTER — CLINICAL SUPPORT (OUTPATIENT)
Dept: REHABILITATION | Facility: HOSPITAL | Age: 43
End: 2023-01-27
Attending: EMERGENCY MEDICINE
Payer: MEDICAID

## 2023-01-27 DIAGNOSIS — M25.661 IMPAIRED RANGE OF MOTION OF RIGHT KNEE: Primary | ICD-10-CM

## 2023-01-27 DIAGNOSIS — M22.2X1 PATELLOFEMORAL PAIN SYNDROME OF BOTH KNEES: ICD-10-CM

## 2023-01-27 DIAGNOSIS — M22.2X2 PATELLOFEMORAL PAIN SYNDROME OF BOTH KNEES: ICD-10-CM

## 2023-01-27 PROCEDURE — 97110 THERAPEUTIC EXERCISES: CPT | Mod: PO,CQ

## 2023-01-27 NOTE — PROGRESS NOTES
"[facility]   Physical Therapy Treatment Note     Name: Refugio Segovia  Clinic Number: [id number]    Therapy Diagnosis:   Encounter Diagnoses   Name Primary?    Impaired range of motion of right knee Yes    Patellofemoral pain syndrome of both knees      Physician: gAustin Medina III,*    Visit Date: 1/27/2023  Physician Orders: PT Eval and Treat   Medical Diagnosis from Referral:   M79.604,M79.605 (ICD-10-CM) - Bilateral leg pain   M25.561,M25.562 (ICD-10-CM) - Acute bilateral knee pain      Evaluation Date: 1/13/2023  Authorization Period Expiration: 1/10/24  Plan of Care Expiration: 2/24/23  Progress Note Due: 2/13/23  Visit # / Visits authorized: 4/20  FOTO: 1/3     Precautions: Standard     PTA Visit #: 3/5     Time In: 9:27 AM (arrived late)   Time Out: 10:00 AM  Total Billable Time: 33 minutes    SUBJECTIVE     Pt reports: her right knee was painful when she got home from work last night, did her exercises, and then felt better.  She was compliant with home exercise program.  Response to previous treatment: fatigue   Functional change: ongoing    Pain: 4/10  Location: right knee    OBJECTIVE     Objective Measures updated at progress report unless specified.     Treatment     Refugio received the treatments listed below:      therapeutic exercises to develop strength, endurance, ROM, and posture for 38 minutes including:  Nu-step 5' level 2  Heel slides (supine w/ towel) 20x ea LE  Quad sets 5" 10x ea   Bridge 2x10  SLR 2x10 ea  SLL 1x10 ea  SAQ 2x10 ea LE, 3" hold  Supine clam, GTB 2x15 (progress to s/l NV)  S/L reverse clam 1x10 ea  3 way glute 5x ea  TKE add NV    Patient Education and Home Exercises     Home Exercises Provided and Patient Education Provided     Education provided:   - continue HEP  - demonstration and cuing of exercises    Written Home Exercises Provided: Patient instructed to cont prior HEP. Exercises were reviewed and Refugio was able to demonstrate " them prior to the end of the session.  Refugio demonstrated good  understanding of the education provided. See EMR under Patient Instructions for exercises provided during therapy sessions    ASSESSMENT   Patient with improved tolerance to her program this date, but still complains of increasing muscular fatigue throughout her visit due to remaining strength and endurance deficits through her BLEs. We will continue to work towards further symptom reduction by addressing objective deficits to maximize her overall function.     Refugio is progressing well towards her goals.   Pt prognosis is Good.     Pt will continue to benefit from skilled outpatient physical therapy to address the deficits listed in the problem list box on initial evaluation, provide pt/family education and to maximize pt's level of independence in the home and community environment.     Pt's spiritual, cultural and educational needs considered and pt agreeable to plan of care and goals.     Anticipated barriers to physical therapy: obesity, scheduling    Goals:   Short Term Goals: 3 weeks   1. Pt will report </= 5/10 pain at worst in bilateral knees to improve tolerance to exercise.   2. Pt will improve MMT B LE musculature by 1/2 grade to improve function.   3. Pt will be independent with HEP to promote return to recreational activities.   4. Pt to tolerate 10 mins of walking on treadmill with proper heel to toe gait and no pain complaints in order to improve walking tolerance/endurance.       Long Term Goals: 6 weeks  1. Pt will report </= 3/10 pain in knees to improve tolerance to exercise .  2. Pt will improve MMT B LE musculature by 1 muscle grade to improve functional mobility.  3. Pt will be independent with HEP progressions to promote return to recreational activities.  4. Pt to report no pain with stair navigation in order to improve functional mobility.   5. Pt to tolerate walking 15 minutes on treadmill with proper heel to toe gait  and no pain complaints in order to improve walking tolerance/endurance.    6.  Pt improve impaired right knee ROM to WNL in all planes to improve mobility for normal movement patterns.     PLAN     Continue to progress within PLAN OF CARE to address pt deficits and complaints.     Amarilis Aguirre, PTA

## 2023-02-07 ENCOUNTER — TELEPHONE (OUTPATIENT)
Dept: REHABILITATION | Facility: HOSPITAL | Age: 43
End: 2023-02-07

## 2023-02-09 ENCOUNTER — CLINICAL SUPPORT (OUTPATIENT)
Dept: REHABILITATION | Facility: HOSPITAL | Age: 43
End: 2023-02-09
Attending: EMERGENCY MEDICINE
Payer: MEDICAID

## 2023-02-09 DIAGNOSIS — M22.2X1 PATELLOFEMORAL PAIN SYNDROME OF BOTH KNEES: ICD-10-CM

## 2023-02-09 DIAGNOSIS — M25.661 IMPAIRED RANGE OF MOTION OF RIGHT KNEE: Primary | ICD-10-CM

## 2023-02-09 DIAGNOSIS — M22.2X2 PATELLOFEMORAL PAIN SYNDROME OF BOTH KNEES: ICD-10-CM

## 2023-02-09 PROCEDURE — 97110 THERAPEUTIC EXERCISES: CPT | Mod: PO,CQ

## 2023-02-09 NOTE — PROGRESS NOTES
"[facility]   Physical Therapy Treatment Note     Name: Refugio Segovia  Clinic Number: [id number]    Therapy Diagnosis:   Encounter Diagnoses   Name Primary?    Impaired range of motion of right knee Yes    Patellofemoral pain syndrome of both knees      Physician: Agustin Medina III,*    Visit Date: 2/9/2023  Physician Orders: PT Eval and Treat   Medical Diagnosis from Referral:   M79.604,M79.605 (ICD-10-CM) - Bilateral leg pain   M25.561,M25.562 (ICD-10-CM) - Acute bilateral knee pain      Evaluation Date: 1/13/2023  Authorization Period Expiration: 1/10/24  Plan of Care Expiration: 2/24/23  Progress Note Due: 2/13/23  Visit # / Visits authorized: 4/20  FOTO: 1/3     Precautions: Standard   PTA Visit #: 4/5     Time In: 1:15 PM  Time Out: 2:00 PM  Total Billable Time: 45 minutes    SUBJECTIVE     Pt reports: No new complaints at this time.  She was compliant with home exercise program.  Response to previous treatment: good  Functional change: ongoing    Pain: 6/10  Location: right knee    OBJECTIVE     Objective Measures updated at progress report unless specified.     Treatment     Rfeugio received the treatments listed below:      therapeutic exercises to develop strength, endurance, ROM, and posture for 45 minutes including:    Nu-step 6' level 2  SAQ 5# 2x10 3" hold kusum   SLR 2x10 kusum   Bridge 3x10 3" hold   S/L clam RTB 2x10 kusum  LAQ 5# 2x10 3" hold   HS curl 2x10 ksuum  STS form chair with airex pad 2x10   Step up 4 in 2x10 kusum     Patient Education and Home Exercises     Home Exercises Provided and Patient Education Provided     Education provided:   - continue HEP  - demonstration and cuing of exercises    Written Home Exercises Provided: Patient instructed to cont prior HEP. Exercises were reviewed and Refugio was able to demonstrate them prior to the end of the session.  Refugio demonstrated good  understanding of the education provided. See EMR under Patient Instructions " for exercises provided during therapy sessions    ASSESSMENT     Pt reported discomfort in the anterior aspect of knees R>L while performing CKC exercises, however pt was able complete exercises with rest breaks. Focus on further stabilization and strengthening exercises in standing to promote functional movement patterns.     Refugio is progressing well towards her goals.   Pt prognosis is Good.     Pt will continue to benefit from skilled outpatient physical therapy to address the deficits listed in the problem list box on initial evaluation, provide pt/family education and to maximize pt's level of independence in the home and community environment.     Pt's spiritual, cultural and educational needs considered and pt agreeable to plan of care and goals.     Anticipated barriers to physical therapy: obesity, scheduling    Goals:   Short Term Goals: 3 weeks   1. Pt will report </= 5/10 pain at worst in bilateral knees to improve tolerance to exercise.   2. Pt will improve MMT B LE musculature by 1/2 grade to improve function.   3. Pt will be independent with HEP to promote return to recreational activities.   4. Pt to tolerate 10 mins of walking on treadmill with proper heel to toe gait and no pain complaints in order to improve walking tolerance/endurance.       Long Term Goals: 6 weeks  1. Pt will report </= 3/10 pain in knees to improve tolerance to exercise .  2. Pt will improve MMT B LE musculature by 1 muscle grade to improve functional mobility.  3. Pt will be independent with HEP progressions to promote return to recreational activities.  4. Pt to report no pain with stair navigation in order to improve functional mobility.   5. Pt to tolerate walking 15 minutes on treadmill with proper heel to toe gait and no pain complaints in order to improve walking tolerance/endurance.    6.  Pt improve impaired right knee ROM to WNL in all planes to improve mobility for normal movement patterns.     PLAN     Continue  to progress within PLAN OF CARE to address pt deficits and complaints.     Amarilis Hyde, PTA

## 2023-02-13 ENCOUNTER — CLINICAL SUPPORT (OUTPATIENT)
Dept: REHABILITATION | Facility: HOSPITAL | Age: 43
End: 2023-02-13
Attending: EMERGENCY MEDICINE
Payer: MEDICAID

## 2023-02-13 DIAGNOSIS — M22.2X2 PATELLOFEMORAL PAIN SYNDROME OF BOTH KNEES: ICD-10-CM

## 2023-02-13 DIAGNOSIS — M25.661 IMPAIRED RANGE OF MOTION OF RIGHT KNEE: Primary | ICD-10-CM

## 2023-02-13 DIAGNOSIS — M22.2X1 PATELLOFEMORAL PAIN SYNDROME OF BOTH KNEES: ICD-10-CM

## 2023-02-13 PROCEDURE — 97110 THERAPEUTIC EXERCISES: CPT | Mod: PO

## 2023-02-13 NOTE — PROGRESS NOTES
"[facility]   Physical Therapy Treatment Note / Reassessment     Name: Refugio Segovia  Clinic Number: [id number]    Therapy Diagnosis:   Encounter Diagnoses   Name Primary?    Impaired range of motion of right knee Yes    Patellofemoral pain syndrome of both knees        Physician: Agustin Medina III,*    Visit Date: 2/13/2023  Physician Orders: PT Eval and Treat   Medical Diagnosis from Referral:   M79.604,M79.605 (ICD-10-CM) - Bilateral leg pain   M25.561,M25.562 (ICD-10-CM) - Acute bilateral knee pain      Evaluation Date: 1/13/2023  Authorization Period Expiration: 1/10/24  Plan of Care Expiration: 2/24/23  Progress Note Due: 3/13/23  Visit # / Visits authorized: 5/20  FOTO: 1/3     Precautions: Standard   PTA Visit #: 4/5     Time In: 10:45 AM  Time Out: 11:00 AM  Total Billable Time: 45 minutes    SUBJECTIVE     Pt reports: No new complaints at this time.  She was compliant with home exercise program.  Response to previous treatment: good  Functional change: ongoing    Pain: 6/10  Location: right knee    OBJECTIVE     Active ROM: (measured in degrees)   Knee     Right 6-110   Left 0-120      Sensation: intact      Lower Extremity Strength (graded 0-5 out of 5)     Right LE Left LE   Hip flexion: 5/5 5/5   Knee extension:  4+/5 5/5   Ankle dorsiflexion: 5/5 5/5   Posterior fibers of Gluteus Medius 4/5 4+/5   Hip extension: 4/5 4/5   Knee flexion:  5/5 5/5   Ankle plantarflexion: 5/5 5/5       Treatment     Refugio received the treatments listed below:      therapeutic exercises to develop strength, endurance, ROM, and posture for 45 minutes including:    Nu-step 6' level 2  SAQ 5# 2x10 3" hold kusum   SLR 2x10 kusum   Bridge 3x10 3" hold   S/L clam RTB 2x10 kusum  LAQ 5# 2x10 3" hold   HS curl 2x10 kusum  STS form chair with airex pad 2x10   Step up 4 in 2x10 kusum     Patient Education and Home Exercises     Home Exercises Provided and Patient Education Provided     Education provided: "   - continue HEP  - demonstration and cuing of exercises    Written Home Exercises Provided: Patient instructed to cont prior HEP. Exercises were reviewed and Refugio was able to demonstrate them prior to the end of the session.  Refugio demonstrated good  understanding of the education provided. See EMR under Patient Instructions for exercises provided during therapy sessions    ASSESSMENT     Pt reported discomfort in the anterior aspect of knees R>L while performing CKC exercises, however pt was able complete exercises with rest breaks. Focus on further stabilization and strengthening exercises in standing to promote functional movement patterns.     Refugio is progressing well towards her goals.   Pt prognosis is Good.     Pt will continue to benefit from skilled outpatient physical therapy to address the deficits listed in the problem list box on initial evaluation, provide pt/family education and to maximize pt's level of independence in the home and community environment.     Pt's spiritual, cultural and educational needs considered and pt agreeable to plan of care and goals.     Anticipated barriers to physical therapy: obesity, scheduling    Goals:   Short Term Goals: 3 weeks   1. Pt will report </= 5/10 pain at worst in bilateral knees to improve tolerance to exercise.   2. Pt will improve MMT B LE musculature by 1/2 grade to improve function.   3. Pt will be independent with HEP to promote return to recreational activities.   4. Pt to tolerate 10 mins of walking on treadmill with proper heel to toe gait and no pain complaints in order to improve walking tolerance/endurance.       Long Term Goals: 6 weeks  1. Pt will report </= 3/10 pain in knees to improve tolerance to exercise .  2. Pt will improve MMT B LE musculature by 1 muscle grade to improve functional mobility.  3. Pt will be independent with HEP progressions to promote return to recreational activities.  4. Pt to report no pain with stair  navigation in order to improve functional mobility.   5. Pt to tolerate walking 15 minutes on treadmill with proper heel to toe gait and no pain complaints in order to improve walking tolerance/endurance.    6.  Pt improve impaired right knee ROM to WNL in all planes to improve mobility for normal movement patterns.     PLAN     Continue to progress within PLAN OF CARE to address pt deficits and complaints.     Stefano Carvalho, PT

## 2023-03-07 ENCOUNTER — HOSPITAL ENCOUNTER (EMERGENCY)
Facility: HOSPITAL | Age: 43
Discharge: HOME | End: 2023-03-08
Attending: EMERGENCY MEDICINE
Payer: MEDICAID

## 2023-03-07 DIAGNOSIS — N39.0 URINARY TRACT INFECTION WITHOUT HEMATURIA, SITE UNSPECIFIED: Primary | ICD-10-CM

## 2023-03-07 DIAGNOSIS — R55 NEAR SYNCOPE: ICD-10-CM

## 2023-03-07 DIAGNOSIS — R91.1 PULMONARY NODULE: ICD-10-CM

## 2023-03-07 LAB
ALBUMIN SERPL BCP-MCNC: 3.5 G/DL (ref 3.5–5.2)
ALP SERPL-CCNC: 37 U/L (ref 55–135)
ALT SERPL W/O P-5'-P-CCNC: 7 U/L (ref 10–44)
ANION GAP SERPL CALC-SCNC: 5 MMOL/L (ref 8–16)
AST SERPL-CCNC: 13 U/L (ref 10–40)
B-HCG UR QL: NEGATIVE
BACTERIA #/AREA URNS AUTO: ABNORMAL /HPF
BASOPHILS # BLD AUTO: 0.02 K/UL (ref 0–0.2)
BASOPHILS NFR BLD: 0.3 % (ref 0–1.9)
BILIRUB SERPL-MCNC: 0.3 MG/DL (ref 0.1–1)
BILIRUB UR QL STRIP: NEGATIVE
BUN SERPL-MCNC: 8 MG/DL (ref 6–20)
CALCIUM SERPL-MCNC: 9.5 MG/DL (ref 8.7–10.5)
CHLORIDE SERPL-SCNC: 103 MMOL/L (ref 95–110)
CLARITY UR REFRACT.AUTO: ABNORMAL
CO2 SERPL-SCNC: 31 MMOL/L (ref 23–29)
COLOR UR AUTO: YELLOW
CREAT SERPL-MCNC: 0.9 MG/DL (ref 0.5–1.4)
CTP QC/QA: YES
DIFFERENTIAL METHOD: ABNORMAL
EOSINOPHIL # BLD AUTO: 0 K/UL (ref 0–0.5)
EOSINOPHIL NFR BLD: 0.1 % (ref 0–8)
ERYTHROCYTE [DISTWIDTH] IN BLOOD BY AUTOMATED COUNT: 20.5 % (ref 11.5–14.5)
EST. GFR  (NO RACE VARIABLE): >60 ML/MIN/1.73 M^2
GLUCOSE SERPL-MCNC: 97 MG/DL (ref 70–110)
GLUCOSE UR QL STRIP: NEGATIVE
HCT VFR BLD AUTO: 32.9 % (ref 37–48.5)
HGB BLD-MCNC: 9 G/DL (ref 12–16)
HGB UR QL STRIP: NEGATIVE
HYALINE CASTS UR QL AUTO: 2 /LPF
IMM GRANULOCYTES # BLD AUTO: 0.02 K/UL (ref 0–0.04)
IMM GRANULOCYTES NFR BLD AUTO: 0.3 % (ref 0–0.5)
KETONES UR QL STRIP: NEGATIVE
LEUKOCYTE ESTERASE UR QL STRIP: NEGATIVE
LIPASE SERPL-CCNC: 29 U/L (ref 4–60)
LYMPHOCYTES # BLD AUTO: 0.9 K/UL (ref 1–4.8)
LYMPHOCYTES NFR BLD: 10.7 % (ref 18–48)
MCH RBC QN AUTO: 20 PG (ref 27–31)
MCHC RBC AUTO-ENTMCNC: 27.4 G/DL (ref 32–36)
MCV RBC AUTO: 73 FL (ref 82–98)
MICROSCOPIC COMMENT: ABNORMAL
MONOCYTES # BLD AUTO: 0.4 K/UL (ref 0.3–1)
MONOCYTES NFR BLD: 4.7 % (ref 4–15)
NEUTROPHILS # BLD AUTO: 6.6 K/UL (ref 1.8–7.7)
NEUTROPHILS NFR BLD: 83.9 % (ref 38–73)
NITRITE UR QL STRIP: POSITIVE
NRBC BLD-RTO: 0 /100 WBC
PH UR STRIP: 6 [PH] (ref 5–8)
PLATELET # BLD AUTO: 320 K/UL (ref 150–450)
PMV BLD AUTO: 10.3 FL (ref 9.2–12.9)
POTASSIUM SERPL-SCNC: 4.2 MMOL/L (ref 3.5–5.1)
PROT SERPL-MCNC: 8.4 G/DL (ref 6–8.4)
PROT UR QL STRIP: ABNORMAL
RBC # BLD AUTO: 4.5 M/UL (ref 4–5.4)
RBC #/AREA URNS AUTO: 2 /HPF (ref 0–4)
SODIUM SERPL-SCNC: 139 MMOL/L (ref 136–145)
SP GR UR STRIP: 1.01 (ref 1–1.03)
SQUAMOUS #/AREA URNS AUTO: 7 /HPF
TROPONIN I SERPL DL<=0.01 NG/ML-MCNC: <0.006 NG/ML (ref 0–0.03)
URN SPEC COLLECT METH UR: ABNORMAL
WBC # BLD AUTO: 7.91 K/UL (ref 3.9–12.7)
WBC #/AREA URNS AUTO: 14 /HPF (ref 0–5)

## 2023-03-07 PROCEDURE — 84484 ASSAY OF TROPONIN QUANT: CPT | Mod: 91 | Performed by: EMERGENCY MEDICINE

## 2023-03-07 PROCEDURE — 81025 URINE PREGNANCY TEST: CPT | Performed by: EMERGENCY MEDICINE

## 2023-03-07 PROCEDURE — 96365 THER/PROPH/DIAG IV INF INIT: CPT | Mod: 59

## 2023-03-07 PROCEDURE — 83690 ASSAY OF LIPASE: CPT | Performed by: EMERGENCY MEDICINE

## 2023-03-07 PROCEDURE — 93010 EKG 12-LEAD: ICD-10-PCS | Mod: ,,, | Performed by: INTERNAL MEDICINE

## 2023-03-07 PROCEDURE — 87086 URINE CULTURE/COLONY COUNT: CPT | Performed by: EMERGENCY MEDICINE

## 2023-03-07 PROCEDURE — 99284 PR EMERGENCY DEPT VISIT,LEVEL IV: ICD-10-PCS | Mod: ,,, | Performed by: EMERGENCY MEDICINE

## 2023-03-07 PROCEDURE — 99285 EMERGENCY DEPT VISIT HI MDM: CPT | Mod: 25

## 2023-03-07 PROCEDURE — 81001 URINALYSIS AUTO W/SCOPE: CPT | Performed by: EMERGENCY MEDICINE

## 2023-03-07 PROCEDURE — 96366 THER/PROPH/DIAG IV INF ADDON: CPT

## 2023-03-07 PROCEDURE — 85025 COMPLETE CBC W/AUTO DIFF WBC: CPT | Performed by: EMERGENCY MEDICINE

## 2023-03-07 PROCEDURE — 84484 ASSAY OF TROPONIN QUANT: CPT | Performed by: INTERNAL MEDICINE

## 2023-03-07 PROCEDURE — 25000003 PHARM REV CODE 250: Performed by: EMERGENCY MEDICINE

## 2023-03-07 PROCEDURE — 63600175 PHARM REV CODE 636 W HCPCS: Performed by: EMERGENCY MEDICINE

## 2023-03-07 PROCEDURE — 80053 COMPREHEN METABOLIC PANEL: CPT | Performed by: EMERGENCY MEDICINE

## 2023-03-07 PROCEDURE — 99284 EMERGENCY DEPT VISIT MOD MDM: CPT | Mod: ,,, | Performed by: EMERGENCY MEDICINE

## 2023-03-07 PROCEDURE — 93005 ELECTROCARDIOGRAM TRACING: CPT

## 2023-03-07 PROCEDURE — 93010 ELECTROCARDIOGRAM REPORT: CPT | Mod: ,,, | Performed by: INTERNAL MEDICINE

## 2023-03-07 RX ADMIN — IOHEXOL 100 ML: 350 INJECTION, SOLUTION INTRAVENOUS at 11:03

## 2023-03-07 RX ADMIN — CEFTRIAXONE 1 G: 1 INJECTION, POWDER, FOR SOLUTION INTRAMUSCULAR; INTRAVENOUS at 11:03

## 2023-03-08 VITALS
RESPIRATION RATE: 17 BRPM | DIASTOLIC BLOOD PRESSURE: 86 MMHG | SYSTOLIC BLOOD PRESSURE: 160 MMHG | OXYGEN SATURATION: 98 % | HEART RATE: 85 BPM | TEMPERATURE: 98 F

## 2023-03-08 LAB — TROPONIN I SERPL DL<=0.01 NG/ML-MCNC: 0.02 NG/ML (ref 0–0.03)

## 2023-03-08 PROCEDURE — 25500020 PHARM REV CODE 255: Performed by: EMERGENCY MEDICINE

## 2023-03-08 RX ORDER — SULFAMETHOXAZOLE AND TRIMETHOPRIM 800; 160 MG/1; MG/1
1 TABLET ORAL 2 TIMES DAILY
Qty: 14 TABLET | Refills: 0 | Status: SHIPPED | OUTPATIENT
Start: 2023-03-08 | End: 2023-03-13

## 2023-03-08 RX ORDER — SULFAMETHOXAZOLE AND TRIMETHOPRIM 800; 160 MG/1; MG/1
1 TABLET ORAL 2 TIMES DAILY
Qty: 14 TABLET | Refills: 0 | Status: SHIPPED | OUTPATIENT
Start: 2023-03-08 | End: 2023-03-08 | Stop reason: SDUPTHER

## 2023-03-08 NOTE — ED TRIAGE NOTES
"Refugio Segovia, a 42 y.o. female presents to the ED w/ complaint of dizziness and near syncopal episode. Pt reports N/V once after episode. States "I felt like I was having an anxiety attack". Pt denies CP, SOB, N/V upon arrival. Reports throbbing pain to left eye, 7/10.     Triage note:  Chief Complaint   Patient presents with    Dizziness     Pt had near syncopal episode, did not fall or hit head. Pt had nausea and vomiting x 1 after episode. Endorses abd pain but denies c/p, SOB, fevers and chills.     Review of patient's allergies indicates:  No Known Allergies  No past medical history on file.   "

## 2023-03-08 NOTE — ED PROVIDER NOTES
CC: Dizziness (Pt had near syncopal episode, did not fall or hit head. Pt had nausea and vomiting x 1 after episode. Endorses abd pain but denies c/p, SOB, fevers and chills.)      History provided by:   Patient     HPI: Refugio Segovia is a 42 y.o. year old female who presents to the ED for near-syncope episode earlier today at 5:00 p.m. Patient reports standing up to go to the kitchen when she started feeling lightheaded, she lied back down and she started feeling upper abdominal pain with lower extremity numbness, vomiting  She denies any slurred speech, facial weakness or numbness, upper extremity weakness or numbness.  One of her family members saw her soon after the event and she was diaphoretic and speaking in low voice but not slurred or aphasic.  Patient reports at the time of the near-syncope episodes she was feeling flushed    In the last 2 hours she developed intermittent moderate pain above the left eye the lasts for a couple of minutes at a time.  She did not have this type of pain at the time she had the near-syncope episode    She denies any fever, cough, chest pain, shortness of breath    Denies any alcohol, street drugs, previous medical problems, medications that she takes at home.  Denies any history of CAD or CVA      No past medical history on file.  Past Surgical History:   Procedure Laterality Date    BREAST CYST EXCISION Left 1987     Family History   Problem Relation Age of Onset    Heart disease Mother     Diabetes Father      No current facility-administered medications on file prior to encounter.     Current Outpatient Medications on File Prior to Encounter   Medication Sig Dispense Refill    ferrous sulfate 325 (65 FE) MG EC tablet Take 1 tablet (325 mg total) by mouth every other day. 60 tablet 1    ibuprofen (ADVIL,MOTRIN) 400 MG tablet Take 1 tablet (400 mg total) by mouth every 4 (four) hours as needed for Other (Take one tablet with every meal during heavy bleeding). 60 tablet 2      Patient has no known allergies.  Social History     Socioeconomic History    Marital status: Single   Tobacco Use    Smoking status: Never    Smokeless tobacco: Never   Substance and Sexual Activity    Alcohol use: Never    Drug use: Never         PHYSICAL EXAM:  Vitals:    03/07/23 1851   BP: 128/81   Pulse: 73   Resp: 18   Temp: 97.7 °F (36.5 °C)     VS: triage VS reviewed    general: comfortable, in no acute distress, pleasant, well nourished  HENT: neck symmetric, trachea midline  Eyes: PERRL,no conjunctival injection and symmetrical eyelids  CV: RRR, no  murmurs, no rubs, no gallops, no LE edema.  Bilateral symmetrical DP and PT pulses  Resp: comfortable breathing, speaks in full sentences, CTAB, no wheezing, no crackles, no ronchi  ABD: morbidly obese, soft, ND, no masses, + normal BS, epigastric abdominal pain  Renal: No CVAT  Neuro: AAO x 3, 5/5 strength x 4 extremities, sensation intact, face symmetric, speech normal  MSK: NC/AT, extremities w/out deformity   Skin: warm, dry            DATA & INTERVENTIONS:    LABS reviewed:  Labs Reviewed   COMPREHENSIVE METABOLIC PANEL   CBC W/ AUTO DIFFERENTIAL   LIPASE   TROPONIN I   URINALYSIS, REFLEX TO URINE CULTURE       RADIOLOGY reviewed:  Imaging Results    None         MEDICATIONS/FLUIDS:  Medications - No data to display      MDM:  Refugio Segovia is a 42 y.o. year old female who presents to the ED for an episode of near-syncope that started with lightheadedness with standing up followed by abdominal pain, vomiting, bilateral lower extremity numbness.  Currently intermittent left side above left eye pain that lasts for 2 minutes at a time and it is moderate in intensity.  She did not have the pain at the time of the near-syncope episode    Unlikely CVA/SAH or intracranial hemorrhage.  No neurologic deficit in the emergency department.  Will check UPT rule out pregnancy rule out ectopic, will obtain CTA aorta rule out aortic dissection versus AAA will  obtain troponin, EKG rule out ACS. Low suspicion for PE (no chest pain/sob, no recent travel, no hx of dvt/pe, no LE swelling/pain, no recent surgical procedures/immobilization)    Orthostatics however orthostasis will not explain the vomiting and the abdominal pain    DDX includes but not limited to: as above    Labs ordered and reviewed:   UPT   CMP wnl  CBC  normal wbc/plt, Hg 9  Lipase wnl  Troponin wnl  UA pos nitrites  U micro 14 wbc, many bacteria      Medication given in the ED: ceftriaxone    CXR, CTA chest abdomen pelvis (ordered and reviewed): CTA pending  Imagings independently visualized: CXR no infiltrate    EKG (independantly reviewed): VR 78, NSR no acute ischemia    Patient was signed-out to Dr. Goss at the change of shift with plan for: CTA c/a/p and rpt trop pending      IMPRESSION:  1.) near syncope  2.) UTI    Dispo: pending    Critical Care Time: N/A       Akiko Du MD  03/08/23 9073

## 2023-03-08 NOTE — PROVIDER PROGRESS NOTES - EMERGENCY DEPT.
Signout Note  I received signout from the previous providers.     Chief complaint:  Dizziness (Pt had near syncopal episode, did not fall or hit head. Pt had nausea and vomiting x 1 after episode. Endorses abd pain but denies c/p, SOB, fevers and chills.)    Pertinent History, ED course, Disposition:    Per sign out and chart review: Refugio Segovia is a 42 y.o. female who presented to emergency department with complaint of near-syncope at 5:00 p.m. the day of arrival in the ED. she was noted to have a UTI and she was given Rocephin.  Pt signed out to me pending:  CTA aorta scan with reassessment.    Update/ Disposition:  Patient's CTA abdomen and pelvis showed no acute aortic pathology.  It showed diverticulosis without diverticulitis.  She was noted to have a pulmonary nodule.  On repeat assessment she denies ongoing lightheadedness and has no complaints.  She ambulated and did not desaturate.  Her gait was noted to be stable.  Her chart was reviewed and she had no previous urine cultures.  She was discharged on Bactrim for her UTI.  Her repeat abdominal exam was overall benign.  I believe that her lightheadedness was due to her UTI.  She was updated that she would need to have her pulmonary nodule followed up in a year with her PCP.  Strict return precautions were discussed.  She voiced verbal understanding agreement with the plan.  Patient deemed stable for discharge    Patient,caregiver and/or family understands the plan and verbalized agreement. All questions answered.     Diagnostic Impression:    1. Urinary tract infection without hematuria, site unspecified    2. Near syncope    3. Pulmonary nodule         ED Disposition Condition    Discharge Stable          ED Prescriptions       Medication Sig Dispense Start Date End Date Auth. Provider    sulfamethoxazole-trimethoprim 800-160mg (BACTRIM DS) 800-160 mg Tab  (Status: Discontinued) Take 1 tablet by mouth 2 (two) times daily. for 5 days 14 tablet 3/8/2023  3/8/2023 Martínez Phillips MD    sulfamethoxazole-trimethoprim 800-160mg (BACTRIM DS) 800-160 mg Tab Take 1 tablet by mouth 2 (two) times daily. for 5 days 14 tablet 3/8/2023 3/13/2023 Martínez Phillips MD          Follow-up Information       Follow up With Specialties Details Why Contact Info    Capo Nieto MD Family Medicine In 2 days  [address]  [facility] [address]  [phone]      [facility] - Emergency Dept Emergency Medicine  If symptoms worsen [address] [facility] [address] [phone]                       Vitals:    03/08/23 0159   BP: (!) 160/86   Pulse: 85   Resp: 17   Temp: 98.2 °F (36.8 °C)       Labs Reviewed   COMPREHENSIVE METABOLIC PANEL - Abnormal; Notable for the following components:       Result Value    CO2 31 (*)     Alkaline Phosphatase 37 (*)     ALT 7 (*)     Anion Gap 5 (*)     All other components within normal limits   CBC W/ AUTO DIFFERENTIAL - Abnormal; Notable for the following components:    Hemoglobin 9.0 (*)     Hematocrit 32.9 (*)     MCV 73 (*)     MCH 20.0 (*)     MCHC 27.4 (*)     RDW 20.5 (*)     Lymph # 0.9 (*)     Gran % 83.9 (*)     Lymph % 10.7 (*)     All other components within normal limits   URINALYSIS, REFLEX TO URINE CULTURE - Abnormal; Notable for the following components:    Appearance, UA Hazy (*)     Protein, UA Trace (*)     Nitrite, UA Positive (*)     All other components within normal limits    Narrative:     Specimen Source->Urine   URINALYSIS MICROSCOPIC - Abnormal; Notable for the following components:    WBC, UA 14 (*)     Bacteria Many (*)     Hyaline Casts, UA 2 (*)     All other components within normal limits    Narrative:     Specimen Source->Urine   CULTURE, URINE   LIPASE   TROPONIN I   TROPONIN I   POCT URINE PREGNANCY       Imaging Studies:    CTA Chest Abdomen Pelvis   Final Result   Abnormal      The aorta is normal in caliber without evidence of aneurysm or dissection noting beam hardening artifact limits  evaluation.      Diverticulosis coli without evidence of acute diverticulitis.      Splenomegaly.      Pulmonary micronodule in lateral segment the right lower lobe.  For a solid nodule <6 mm, Fleischner Society 2017 guidelines recommend no routine follow up for a low risk patient, or follow-up with non-contrast chest CT at 12 months in a high risk patient.      This report was flagged in Epic as abnormal.      Electronically signed by resident: Vineet Godwin   Date:    03/07/2023   Time:    23:43      Electronically signed by: Sawyer Lewis MD   Date:    03/08/2023   Time:    00:30      X-Ray Chest AP Portable   Final Result      There is no radiographic evidence for acute intrathoracic process.         Electronically signed by: Trung Velazquez   Date:    03/07/2023   Time:    22:43          Medications Given:  Medications   cefTRIAXone (ROCEPHIN) 1 g in dextrose 5 % in water (D5W) 5 % 50 mL IVPB (MB+) (0 g Intravenous Stopped 3/8/23 0124)   iohexoL (OMNIPAQUE 350) injection 100 mL (100 mLs Intravenous Given 3/7/23 2327)                Please note that this dictation was completed with computer voice recognition software.  Quite often unanticipated grammatical, syntax, homophones, and other interpretive errors are inadvertently transcribed by the computer software.  Please disregard these errors.  Please excuse any errors that have escaped final proofreading.

## 2023-03-08 NOTE — DISCHARGE INSTRUCTIONS
Diagnosis:   1. Urinary tract infection without hematuria, site unspecified    2. Near syncope        Tests you had showed:   Labs Reviewed   COMPREHENSIVE METABOLIC PANEL - Abnormal; Notable for the following components:       Result Value    CO2 31 (*)     Alkaline Phosphatase 37 (*)     ALT 7 (*)     Anion Gap 5 (*)     All other components within normal limits   CBC W/ AUTO DIFFERENTIAL - Abnormal; Notable for the following components:    Hemoglobin 9.0 (*)     Hematocrit 32.9 (*)     MCV 73 (*)     MCH 20.0 (*)     MCHC 27.4 (*)     RDW 20.5 (*)     Lymph # 0.9 (*)     Gran % 83.9 (*)     Lymph % 10.7 (*)     All other components within normal limits   URINALYSIS, REFLEX TO URINE CULTURE - Abnormal; Notable for the following components:    Appearance, UA Hazy (*)     Protein, UA Trace (*)     Nitrite, UA Positive (*)     All other components within normal limits    Narrative:     Specimen Source->Urine   URINALYSIS MICROSCOPIC - Abnormal; Notable for the following components:    WBC, UA 14 (*)     Bacteria Many (*)     Hyaline Casts, UA 2 (*)     All other components within normal limits    Narrative:     Specimen Source->Urine   CULTURE, URINE   LIPASE   TROPONIN I   TROPONIN I   POCT URINE PREGNANCY      CTA Chest Abdomen Pelvis   Final Result   Abnormal      The aorta is normal in caliber without evidence of aneurysm or dissection noting beam hardening artifact limits evaluation.      Diverticulosis coli without evidence of acute diverticulitis.      Splenomegaly.      Pulmonary micronodule in lateral segment the right lower lobe.  For a solid nodule <6 mm, Fleischner Society 2017 guidelines recommend no routine follow up for a low risk patient, or follow-up with non-contrast chest CT at 12 months in a high risk patient.      This report was flagged in Epic as abnormal.      Electronically signed by resident: Vineet Godwin   Date:    03/07/2023   Time:    23:43      Electronically signed by: Sawyer  MD Debbie   Date:    03/08/2023   Time:    00:30      X-Ray Chest AP Portable   Final Result      There is no radiographic evidence for acute intrathoracic process.         Electronically signed by: Trung Velazquez   Date:    03/07/2023   Time:    22:43          Treatments you received were:   Medications   cefTRIAXone (ROCEPHIN) 1 g in dextrose 5 % in water (D5W) 5 % 50 mL IVPB (MB+) (1 g Intravenous New Bag 3/7/23 3410)   iohexoL (OMNIPAQUE 350) injection 100 mL (100 mLs Intravenous Given 3/7/23 3993)       Home Care Instructions:  - Medications: Continue taking your home medications as prescribed    Follow-Up Plan:  - Follow-up with: Primary care doctor within 1-2  days  - Additional testing and/or evaluation will be directed by your primary doctor    Return to the Emergency Department for symptoms including but not limited to: worsening symptoms, severe back pain, shortness of breath or chest pain, vomiting with inability to hold down fluids, blood in vomit or poop, fevers greater than 100.4°F, passing out/fainting/unconsciousness, or other concerning symptoms.     No future appointments.    You were noted to have a pulmonary nodule and he will need to follow-up with your PCP for this.

## 2023-03-09 ENCOUNTER — PATIENT OUTREACH (OUTPATIENT)
Dept: EMERGENCY MEDICINE | Facility: HOSPITAL | Age: 43
End: 2023-03-09
Payer: MEDICAID

## 2023-03-09 LAB
BACTERIA UR CULT: NORMAL
BACTERIA UR CULT: NORMAL

## 2023-03-09 NOTE — PROGRESS NOTES
Moon Bolaños LPN  ED Navigator  Emergency Department    Project: [facility] ED Navigator  Role: Community Health Worker    Date: 03/09/2023  Patient Name: Refugio Segovia  MRN: [id number]  PCP: Capo Nieto MD    Assessment:     Refugio Segovia is a 42 y.o. female who has presented to ED for UTI. Patient has visited the ED 2 times in the past 3 months. Patient did contact PCP.     ED Navigator Initial Assessment    ED Navigator Enrollment Documentation  Consent to Services  Does patient consent to completing the assessment?: Yes  Contact  Method of Initial Contact: Phone  Transportation  Does the patient have issues with Transportation?: Yes  Does the patient have transportation to and from healthcare appointments?: Yes  Can family, friends, or others help?: Yes  Lack of transportation to appts, pharmacy, etc.?: No  What type of assistance is needed?: Standard (RTA/MITS)  What is available in their region?: bus, uber, medical transportation.  Insurance Coverage  Do you have coverage/adequate coverage?: Yes  Type/kind of coverage: Medicaid/[facility]  Is patient able to afford co-pays/deductibles?: Yes  Is patient able to afford HME or supplies?: Yes  Does patient have an established [facility] PCP?: Yes  Able to access?: Yes  Does the patient have a lack of adequate coverage?: No  Specialist Appointment  Did the patient come to the ED to see a specialist?: No  Does the patient have a pending specialist referral?: No  Does the patient have a specialist appointment made?: No  PCP Follow Up Appointment  Has the patient had an appointment with a primary care provider in the past year?: Yes  Approximate date: 3/9/22  Provider: Capo Nieto MD  Does the patient have a follow up appontment with a PCP?: Yes  Upcoming appointment date: 3/13/23  Provider: Capo Nieto MD  When was the last time you saw your PCP?: 3/9/22  Medications  Is patient able to afford medication?: Yes  Is patient unable to  get medication due to lack of transportation?: No  Psychological  Does the patient have psycho-social concerns?: No  Food  Does the patient have concerns about food?: No  Communication/Education  Does the patient have limited English proficiency/English not primary language?: No  Does patient have low literacy and/or low health literacy?: No  Does patient have concerns with care?: No  Does patient have dissatisfaction with care?: No  Other Financial Concerns  Does the patient have immediate financial distress?: No  Does the patient have general financial concerns?: Yes  Other Social Barriers/Concerns  Does the patient have any additional barriers or concerns?: Unable to afford utilities  Primary Barrier  Barriers identified: Financial barrier (health insurance, employment, etc.)  Root Cause of ED Utilization: Lack of Transportation  Plan to address Lack of Transportation: Provided patient with Medicaid transportation telephone number ([phone])  Next steps: Provided Education  Was education/educational materials provided surrounding PCP services/creating a medical home?: Yes Was education verbal or written?: Written     Was education/educational materials provided surrounding low cost, healthy foods?: Yes Was education verbal or written?: Written     Was education/educational materials provided surrounding other items? If so, use comment to explain.: Yes Was education verbal or written?: Written   Plan: Provided information for [facility] 24/7 Nurse triage line, [phone] or 1-866-[facility] ([phone])  Expected Date of Follow Up 1: 4/6/23         Social History     Socioeconomic History    Marital status: Single   Tobacco Use    Smoking status: Never    Smokeless tobacco: Never   Substance and Sexual Activity    Alcohol use: Never    Drug use: Never     Social Determinants of Health     Financial Resource Strain: Medium Risk    Difficulty of Paying Living Expenses: Somewhat hard   Transportation  Needs: No Transportation Needs    Lack of Transportation (Medical): No    Lack of Transportation (Non-Medical): No   Physical Activity: Inactive    Days of Exercise per Week: 0 days    Minutes of Exercise per Session: 0 min   Stress: Stress Concern Present    Feeling of Stress : To some extent   Social Connections: Moderately Isolated    Frequency of Communication with Friends and Family: More than three times a week    Frequency of Social Gatherings with Friends and Family: More than three times a week    Attends Yarsani Services: 1 to 4 times per year    Active Member of Clubs or Organizations: No    Attends Club or Organization Meetings: Never    Marital Status: Never    Housing Stability: Unknown    Unable to Pay for Housing in the Last Year: No    Unstable Housing in the Last Year: No       Plan:   Call placed to Pt to f/u from recent ER visit for UTI. Pt states that she is feeling much better and the medications are working. Pt has a f/u appt scheduled with Tania Espinoza MD on 3-13-23. Patient was given an application for [place] SNAP benefits (Food stamps).  Patient was given education on Stress and anxiety relief.   Patient was given utility assistance resources for their area.   Patient was given education on Rent/Mortgage payment assistance for their region. Patient was given Medicaid PCP Information Line ([phone]). Patient was given education on (The Right Care at the Right Level information, [facility] Virtual Visit information, and Heart healthy diet tips). Pt advised that the ED Navigator will reach out to her to f/u and she may reach out via Email or phone with any questions or concerns. Pt verbalized understanding.  Moon Bolaños    Transportation Insecurity: resources provided    Appointment made with: Tania sEpinoza MD

## 2023-03-13 ENCOUNTER — OFFICE VISIT (OUTPATIENT)
Dept: PRIMARY CARE CLINIC | Facility: CLINIC | Age: 43
End: 2023-03-13
Payer: MEDICAID

## 2023-03-13 VITALS
SYSTOLIC BLOOD PRESSURE: 136 MMHG | DIASTOLIC BLOOD PRESSURE: 64 MMHG | WEIGHT: 293 LBS | BODY MASS INDEX: 48.82 KG/M2 | HEIGHT: 65 IN | HEART RATE: 81 BPM | TEMPERATURE: 98 F | OXYGEN SATURATION: 100 %

## 2023-03-13 DIAGNOSIS — D50.0 IRON DEFICIENCY ANEMIA DUE TO CHRONIC BLOOD LOSS: ICD-10-CM

## 2023-03-13 DIAGNOSIS — Z12.31 ENCOUNTER FOR SCREENING MAMMOGRAM FOR MALIGNANT NEOPLASM OF BREAST: Primary | ICD-10-CM

## 2023-03-13 DIAGNOSIS — R91.8 PULMONARY NODULES: ICD-10-CM

## 2023-03-13 DIAGNOSIS — N30.00 ACUTE CYSTITIS WITHOUT HEMATURIA: ICD-10-CM

## 2023-03-13 PROCEDURE — 3075F SYST BP GE 130 - 139MM HG: CPT | Mod: CPTII,,, | Performed by: STUDENT IN AN ORGANIZED HEALTH CARE EDUCATION/TRAINING PROGRAM

## 2023-03-13 PROCEDURE — 99999 PR PBB SHADOW E&M-EST. PATIENT-LVL III: ICD-10-PCS | Mod: PBBFAC,,, | Performed by: STUDENT IN AN ORGANIZED HEALTH CARE EDUCATION/TRAINING PROGRAM

## 2023-03-13 PROCEDURE — 3075F PR MOST RECENT SYSTOLIC BLOOD PRESS GE 130-139MM HG: ICD-10-PCS | Mod: CPTII,,, | Performed by: STUDENT IN AN ORGANIZED HEALTH CARE EDUCATION/TRAINING PROGRAM

## 2023-03-13 PROCEDURE — 3008F PR BODY MASS INDEX (BMI) DOCUMENTED: ICD-10-PCS | Mod: CPTII,,, | Performed by: STUDENT IN AN ORGANIZED HEALTH CARE EDUCATION/TRAINING PROGRAM

## 2023-03-13 PROCEDURE — 3008F BODY MASS INDEX DOCD: CPT | Mod: CPTII,,, | Performed by: STUDENT IN AN ORGANIZED HEALTH CARE EDUCATION/TRAINING PROGRAM

## 2023-03-13 PROCEDURE — 3078F PR MOST RECENT DIASTOLIC BLOOD PRESSURE < 80 MM HG: ICD-10-PCS | Mod: CPTII,,, | Performed by: STUDENT IN AN ORGANIZED HEALTH CARE EDUCATION/TRAINING PROGRAM

## 2023-03-13 PROCEDURE — 1159F PR MEDICATION LIST DOCUMENTED IN MEDICAL RECORD: ICD-10-PCS | Mod: CPTII,,, | Performed by: STUDENT IN AN ORGANIZED HEALTH CARE EDUCATION/TRAINING PROGRAM

## 2023-03-13 PROCEDURE — 99213 OFFICE O/P EST LOW 20 MIN: CPT | Mod: PBBFAC,PN | Performed by: STUDENT IN AN ORGANIZED HEALTH CARE EDUCATION/TRAINING PROGRAM

## 2023-03-13 PROCEDURE — 1159F MED LIST DOCD IN RCRD: CPT | Mod: CPTII,,, | Performed by: STUDENT IN AN ORGANIZED HEALTH CARE EDUCATION/TRAINING PROGRAM

## 2023-03-13 PROCEDURE — 99214 OFFICE O/P EST MOD 30 MIN: CPT | Mod: S$PBB,,, | Performed by: STUDENT IN AN ORGANIZED HEALTH CARE EDUCATION/TRAINING PROGRAM

## 2023-03-13 PROCEDURE — 99214 PR OFFICE/OUTPT VISIT, EST, LEVL IV, 30-39 MIN: ICD-10-PCS | Mod: S$PBB,,, | Performed by: STUDENT IN AN ORGANIZED HEALTH CARE EDUCATION/TRAINING PROGRAM

## 2023-03-13 PROCEDURE — 3078F DIAST BP <80 MM HG: CPT | Mod: CPTII,,, | Performed by: STUDENT IN AN ORGANIZED HEALTH CARE EDUCATION/TRAINING PROGRAM

## 2023-03-13 PROCEDURE — 99999 PR PBB SHADOW E&M-EST. PATIENT-LVL III: CPT | Mod: PBBFAC,,, | Performed by: STUDENT IN AN ORGANIZED HEALTH CARE EDUCATION/TRAINING PROGRAM

## 2023-03-13 NOTE — PROGRESS NOTES
03/13/2023    Refugio FELICIANO Luca  [id number]    Chief Complaint   Patient presents with    Follow-up       HPI    This pt is new to me and presents for ED follow up from one week ago where she was dx with a UTI and treated with bactrim. Instructed to follow up for micronodules seen on CTA. Today is feeling well and has 2 days left of abx. Denies any smoking hx.    Negative 10 point ROS outside of HPI    Social History     Socioeconomic History    Marital status: Single   Tobacco Use    Smoking status: Never    Smokeless tobacco: Never   Substance and Sexual Activity    Alcohol use: Never    Drug use: Never    Sexual activity: Not Currently     Social Determinants of Health     Financial Resource Strain: Medium Risk    Difficulty of Paying Living Expenses: Somewhat hard   Transportation Needs: No Transportation Needs    Lack of Transportation (Medical): No    Lack of Transportation (Non-Medical): No   Physical Activity: Inactive    Days of Exercise per Week: 0 days    Minutes of Exercise per Session: 0 min   Stress: Stress Concern Present    Feeling of Stress : To some extent   Social Connections: Moderately Isolated    Frequency of Communication with Friends and Family: More than three times a week    Frequency of Social Gatherings with Friends and Family: More than three times a week    Attends Confucianist Services: 1 to 4 times per year    Active Member of Clubs or Organizations: No    Attends Club or Organization Meetings: Never    Marital Status: Never    Housing Stability: Unknown    Unable to Pay for Housing in the Last Year: No    Unstable Housing in the Last Year: No           Current Outpatient Medications:     ferrous sulfate 325 (65 FE) MG EC tablet, Take 1 tablet (325 mg total) by mouth every other day., Disp: 60 tablet, Rfl: 1    ibuprofen (ADVIL,MOTRIN) 400 MG tablet, Take 1 tablet (400 mg total) by mouth every 4 (four) hours as needed for Other (Take one tablet with every meal during heavy  bleeding)., Disp: 60 tablet, Rfl: 2    sulfamethoxazole-trimethoprim 800-160mg (BACTRIM DS) 800-160 mg Tab, Take 1 tablet by mouth 2 (two) times daily. for 5 days, Disp: 14 tablet, Rfl: 0      Physical Exam  Vitals:    03/13/23 0923   BP: 136/64   Pulse: 81   Temp: 98 °F (36.7 °C)       Gen: well appearing, NAD  Resp: non labored breathing, no crackles, no wheezes, CTAB  CV: RRR no murmur, gallops, rubs, no LE edema  Abd: soft nontender BS present no organomegaly    1. Encounter for screening mammogram for malignant neoplasm of breast  - Mammo Digital Screening Bilat; Future    2. Iron deficiency anemia due to chronic blood loss  H/H 9/73 from last week  Recommended taking iron more consistently and repeat CBC and iron studies in 3 months    3. Pulmonary nodules  CTA 3/7  Pulmonary micronodule in lateral segment the right lower lobe.  For a solid nodule <6 mm, Fleischner Society 2017 guidelines recommend no routine follow up for a low risk patient, or follow-up with non-contrast chest CT at 12 months in a high risk patient.    Low risk patient; no follow up recommended    4. Acute cystitis without hematuria  Reviewed UA  Urine culture with multiple organisms  Continue current regimen      RTC as needed for routine care    Tania Espinoza MD  Family Medicine

## 2023-04-06 ENCOUNTER — PATIENT OUTREACH (OUTPATIENT)
Dept: EMERGENCY MEDICINE | Facility: HOSPITAL | Age: 43
End: 2023-04-06
Payer: MEDICAID

## 2023-04-06 NOTE — PROGRESS NOTES
Call placed to f/u from initial enrollment. Pt states everything is cleared up and she's doing fine. Enrollment resource documents resent at Pt's request. Next f/u scheduled for 5-18-23.

## 2023-04-10 ENCOUNTER — HOSPITAL ENCOUNTER (EMERGENCY)
Facility: HOSPITAL | Age: 43
Discharge: HOME | End: 2023-04-10
Attending: EMERGENCY MEDICINE
Payer: MEDICAID

## 2023-04-10 VITALS
RESPIRATION RATE: 18 BRPM | DIASTOLIC BLOOD PRESSURE: 81 MMHG | SYSTOLIC BLOOD PRESSURE: 138 MMHG | TEMPERATURE: 98 F | OXYGEN SATURATION: 97 % | HEART RATE: 81 BPM | BODY MASS INDEX: 48.82 KG/M2 | WEIGHT: 293 LBS | HEIGHT: 65 IN

## 2023-04-10 DIAGNOSIS — R35.0 URINARY FREQUENCY: ICD-10-CM

## 2023-04-10 DIAGNOSIS — M54.50 ACUTE LOW BACK PAIN WITHOUT SCIATICA, UNSPECIFIED BACK PAIN LATERALITY: Primary | ICD-10-CM

## 2023-04-10 LAB
B-HCG UR QL: NEGATIVE
BACTERIA #/AREA URNS AUTO: ABNORMAL /HPF
BILIRUB UR QL STRIP: NEGATIVE
CLARITY UR REFRACT.AUTO: ABNORMAL
COLOR UR AUTO: YELLOW
CTP QC/QA: YES
GLUCOSE UR QL STRIP: NEGATIVE
HGB UR QL STRIP: ABNORMAL
HYALINE CASTS UR QL AUTO: 0 /LPF
KETONES UR QL STRIP: NEGATIVE
LEUKOCYTE ESTERASE UR QL STRIP: NEGATIVE
MICROSCOPIC COMMENT: ABNORMAL
NITRITE UR QL STRIP: NEGATIVE
PH UR STRIP: 6 [PH] (ref 5–8)
PROT UR QL STRIP: ABNORMAL
RBC #/AREA URNS AUTO: 12 /HPF (ref 0–4)
SP GR UR STRIP: 1.02 (ref 1–1.03)
SQUAMOUS #/AREA URNS AUTO: 30 /HPF
URN SPEC COLLECT METH UR: ABNORMAL
WBC #/AREA URNS AUTO: 1 /HPF (ref 0–5)

## 2023-04-10 PROCEDURE — 25000003 PHARM REV CODE 250: Performed by: EMERGENCY MEDICINE

## 2023-04-10 PROCEDURE — 81025 URINE PREGNANCY TEST: CPT | Performed by: EMERGENCY MEDICINE

## 2023-04-10 PROCEDURE — 99283 EMERGENCY DEPT VISIT LOW MDM: CPT

## 2023-04-10 PROCEDURE — 99284 PR EMERGENCY DEPT VISIT,LEVEL IV: ICD-10-PCS | Mod: ,,, | Performed by: EMERGENCY MEDICINE

## 2023-04-10 PROCEDURE — 99284 EMERGENCY DEPT VISIT MOD MDM: CPT | Mod: ,,, | Performed by: EMERGENCY MEDICINE

## 2023-04-10 PROCEDURE — 81001 URINALYSIS AUTO W/SCOPE: CPT | Performed by: EMERGENCY MEDICINE

## 2023-04-10 RX ORDER — CEPHALEXIN 500 MG/1
500 CAPSULE ORAL EVERY 12 HOURS
Qty: 14 CAPSULE | Refills: 0 | Status: SHIPPED | OUTPATIENT
Start: 2023-04-10 | End: 2023-04-17

## 2023-04-10 RX ORDER — IBUPROFEN 600 MG/1
600 TABLET ORAL
Status: COMPLETED | OUTPATIENT
Start: 2023-04-10 | End: 2023-04-10

## 2023-04-10 RX ADMIN — IBUPROFEN 600 MG: 600 TABLET, FILM COATED ORAL at 11:04

## 2023-04-10 NOTE — ED PROVIDER NOTES
Encounter Date: 4/10/2023    SCRIBE #1 NOTE: I, Pamela Curiel, am scribing for, and in the presence of,  Greg Castro MD. I have scribed the following portions of the note - Other sections scribed: ROS, PE.     History     Chief Complaint   Patient presents with    Female  Problem     Uti 3 weeks ago, took antibiotics still having symptoms, here with special needs sister pt also      41 yo W with pmhx obesity, HTN presents with a chief complaint of urinary frequency.  Patient notes symptoms have been going on for the past 2 days.  Associated with incomplete voiding sensation.  She also reports lower back pain during this time.  She reports history is consistent with previous UTIs.  She was treated for UTI 3 weeks ago with course of Bactrim and symptoms had improved after that.  No fevers, abdominal pain.    Review of patient's allergies indicates:  No Known Allergies  History reviewed. No pertinent past medical history.  Past Surgical History:   Procedure Laterality Date    BREAST CYST EXCISION Left 1987     Family History   Problem Relation Age of Onset    Heart disease Mother     Diabetes Father      Social History     Tobacco Use    Smoking status: Never    Smokeless tobacco: Never   Substance Use Topics    Alcohol use: Never    Drug use: Never     Review of Systems   Constitutional:  Negative for fever.   HENT:  Negative for sore throat.    Eyes:  Negative for visual disturbance.   Respiratory:  Negative for shortness of breath.    Cardiovascular:  Negative for chest pain.   Gastrointestinal:  Negative for abdominal pain.   Genitourinary:  Positive for dysuria and frequency.   Musculoskeletal:  Positive for back pain.   Skin:  Negative for wound.   Neurological:  Negative for headaches.     Physical Exam     Initial Vitals [04/10/23 1017]   BP Pulse Resp Temp SpO2   (!) 140/91 94 18 98 °F (36.7 °C) 98 %      MAP       --         Physical Exam    Nursing note and vitals reviewed.  Constitutional: She  appears well-developed and well-nourished. She is not diaphoretic. No distress.   HENT:   Head: Normocephalic and atraumatic.   Eyes: Right eye exhibits no discharge. Left eye exhibits no discharge. No scleral icterus.   Neck: Neck supple. No JVD present.   Normal range of motion.  Cardiovascular:  Normal rate, regular rhythm and normal heart sounds.     Exam reveals no gallop and no friction rub.       No murmur heard.  Pulmonary/Chest: Breath sounds normal. No respiratory distress. She has no wheezes. She has no rhonchi. She has no rales. She exhibits no tenderness.   Abdominal: Abdomen is soft. Bowel sounds are normal. She exhibits no distension and no mass. There is no abdominal tenderness.   No right CVA tenderness.  No left CVA tenderness. There is no rebound and no guarding.   Musculoskeletal:         General: No tenderness or edema. Normal range of motion.      Cervical back: Normal range of motion and neck supple.      Lumbar back: No tenderness or bony tenderness.      Comments: No midline lumbar tenderness.     Neurological: She is alert and oriented to person, place, and time. She has normal strength.   Skin: Skin is warm and dry. Capillary refill takes less than 2 seconds.   Psychiatric: She has a normal mood and affect. Thought content normal.       ED Course   Procedures  Labs Reviewed   URINALYSIS, REFLEX TO URINE CULTURE - Abnormal; Notable for the following components:       Result Value    Appearance, UA Hazy (*)     Protein, UA 1+ (*)     Occult Blood UA Trace (*)     All other components within normal limits    Narrative:     Specimen Source->Urine   URINALYSIS MICROSCOPIC - Abnormal; Notable for the following components:    RBC, UA 12 (*)     All other components within normal limits    Narrative:     Specimen Source->Urine   POCT URINE PREGNANCY          Imaging Results    None          Medications   ibuprofen tablet 600 mg (600 mg Oral Given 4/10/23 1115)     Medical Decision Making:    History:   Old Medical Records: I decided to obtain old medical records.  Initial Assessment:   41 yo W with pmhx obesity, HTN presents with a chief complaint of urinary frequency.   Differential Diagnosis:   UTI    No CVA tenderness to percussion, no nausea, no fever, no upper GI symptoms, doubt pyelonephritis.  No midline lumbar tenderness, doubt epidural abscess/diskitis.  No lower extremity weakness or midline tenderness, doubt cauda equina.  Clinical Tests:   Lab Tests: Ordered  ED Management:  Will obtain UA and administer ibuprofen.    Reassessment:  Urinalysis with 30 squamous cells contaminated, but no evidence of UTI.  This being the case, this is not consistent with acute cystitis.  On repeat assessment, patient reports feeling improved.  I explained that no emergent cause of her symptoms were found today.  Instructed to continue use of ibuprofen or acetaminophen for low back pain.  Should her urinary symptoms persist, or she develops fever, I provided her with a course of Keflex to be taken prn.  Explained that further evaluation should be provided by her PCP prior to initiating this.  She is comfortable with that plan.        Scribe Attestation:   Scribe #1: I performed the above scribed service and the documentation accurately describes the services I performed. I attest to the accuracy of the note.    Attending Attestation:           Physician Attestation for Scribe:      Comments: I, Dr. Greg Castro, personally performed the services described in this documentation. All medical record entries made by the scribe were at my direction and in my presence.  I have reviewed the chart and agree that the record reflects my personal performance and is accurate and complete. Greg Castro MD.  1:46 PM 04/10/2023                       Clinical Impression:   Final diagnoses:  [M54.50] Acute low back pain without sciatica, unspecified back pain laterality (Primary)  [R35.0] Urinary frequency        ED  Disposition Condition    Discharge Stable          ED Prescriptions       Medication Sig Dispense Start Date End Date Auth. Provider    cephALEXin (KEFLEX) 500 MG capsule Take 1 capsule (500 mg total) by mouth every 12 (twelve) hours. for 7 days 14 capsule 4/10/2023 4/17/2023 Greg Castro MD          Follow-up Information       Follow up With Specialties Details Why Contact Info    Capo Nieto MD Family Medicine   [address] [facility] [address]  [phone]      [facility] - Emergency Dept Emergency Medicine  As needed, If symptoms worsen [address] [facility] [id number] [phone]             Greg Castro MD  04/10/23 4185

## 2023-04-10 NOTE — DISCHARGE INSTRUCTIONS
As I explained, you do not have an evidence of a urinary tract infection on your urinalysis.  Continue taking Tylenol and ibuprofen for back pain.  However, if your urinary symptoms continue for another couple of days, you may consider starting the course of Keflex to treat a UTI.  Be sure to follow-up with your primary care doctor for further evaluation

## 2023-04-10 NOTE — ED TRIAGE NOTES
Patient presents to the ED with complaints of urinary frequency and slight lower back pain. Diagnosed with UTI approx 3 weeks ago and finished all of her antibiotics. Symptoms started again three days ago. Denies fever or chills.

## 2023-04-10 NOTE — ED NOTES
Called lab to inquire about time of urine results. Lab states they have the sample and it is processing

## 2023-04-12 ENCOUNTER — PATIENT MESSAGE (OUTPATIENT)
Dept: ADMINISTRATIVE | Facility: HOSPITAL | Age: 43
End: 2023-04-12
Payer: MEDICAID

## 2023-05-18 ENCOUNTER — PATIENT OUTREACH (OUTPATIENT)
Dept: EMERGENCY MEDICINE | Facility: HOSPITAL | Age: 43
End: 2023-05-18
Payer: MEDICAID

## 2023-05-18 NOTE — PROGRESS NOTES
Call placed to Pt in regards to fu from last encounter and recent ER visit. No answer. Next f/u scheduled for 06-29-23.

## 2023-05-31 ENCOUNTER — HOSPITAL ENCOUNTER (EMERGENCY)
Facility: HOSPITAL | Age: 43
Discharge: HOME | End: 2023-05-31
Attending: EMERGENCY MEDICINE
Payer: MEDICAID

## 2023-05-31 VITALS
DIASTOLIC BLOOD PRESSURE: 78 MMHG | RESPIRATION RATE: 18 BRPM | TEMPERATURE: 98 F | WEIGHT: 293 LBS | BODY MASS INDEX: 50.75 KG/M2 | OXYGEN SATURATION: 95 % | SYSTOLIC BLOOD PRESSURE: 125 MMHG | HEART RATE: 80 BPM

## 2023-05-31 DIAGNOSIS — R42 POSTURAL DIZZINESS WITH PRESYNCOPE: ICD-10-CM

## 2023-05-31 DIAGNOSIS — R55 POSTURAL DIZZINESS WITH PRESYNCOPE: ICD-10-CM

## 2023-05-31 DIAGNOSIS — N39.0 URINARY TRACT INFECTION WITHOUT HEMATURIA, SITE UNSPECIFIED: Primary | ICD-10-CM

## 2023-05-31 LAB
ALBUMIN SERPL BCP-MCNC: 3.4 G/DL (ref 3.5–5.2)
ALP SERPL-CCNC: 32 U/L (ref 55–135)
ALT SERPL W/O P-5'-P-CCNC: 9 U/L (ref 10–44)
ANION GAP SERPL CALC-SCNC: 11 MMOL/L (ref 8–16)
AST SERPL-CCNC: 14 U/L (ref 10–40)
B-HCG UR QL: NEGATIVE
BACTERIA #/AREA URNS AUTO: ABNORMAL /HPF
BASOPHILS # BLD AUTO: 0.03 K/UL (ref 0–0.2)
BASOPHILS NFR BLD: 0.5 % (ref 0–1.9)
BILIRUB SERPL-MCNC: 0.2 MG/DL (ref 0.1–1)
BILIRUB UR QL STRIP: NEGATIVE
BUN SERPL-MCNC: 12 MG/DL (ref 6–20)
BUN SERPL-MCNC: 14 MG/DL (ref 6–30)
CALCIUM SERPL-MCNC: 9.5 MG/DL (ref 8.7–10.5)
CHLORIDE SERPL-SCNC: 103 MMOL/L (ref 95–110)
CHLORIDE SERPL-SCNC: 105 MMOL/L (ref 95–110)
CLARITY UR REFRACT.AUTO: ABNORMAL
CO2 SERPL-SCNC: 25 MMOL/L (ref 23–29)
COLOR UR AUTO: YELLOW
CREAT SERPL-MCNC: 0.8 MG/DL (ref 0.5–1.4)
CREAT SERPL-MCNC: 0.8 MG/DL (ref 0.5–1.4)
CTP QC/QA: YES
DIFFERENTIAL METHOD: ABNORMAL
EOSINOPHIL # BLD AUTO: 0 K/UL (ref 0–0.5)
EOSINOPHIL NFR BLD: 0.4 % (ref 0–8)
ERYTHROCYTE [DISTWIDTH] IN BLOOD BY AUTOMATED COUNT: 19.1 % (ref 11.5–14.5)
EST. GFR  (NO RACE VARIABLE): >60 ML/MIN/1.73 M^2
GLUCOSE SERPL-MCNC: 89 MG/DL (ref 70–110)
GLUCOSE SERPL-MCNC: 97 MG/DL (ref 70–110)
GLUCOSE UR QL STRIP: NEGATIVE
HCT VFR BLD AUTO: 33.5 % (ref 37–48.5)
HCT VFR BLD CALC: 33 %PCV (ref 36–54)
HGB BLD-MCNC: 9.3 G/DL (ref 12–16)
HGB UR QL STRIP: NEGATIVE
HYALINE CASTS UR QL AUTO: 24 /LPF
IMM GRANULOCYTES # BLD AUTO: 0.02 K/UL (ref 0–0.04)
IMM GRANULOCYTES NFR BLD AUTO: 0.4 % (ref 0–0.5)
KETONES UR QL STRIP: NEGATIVE
LEUKOCYTE ESTERASE UR QL STRIP: NEGATIVE
LIPASE SERPL-CCNC: 31 U/L (ref 4–60)
LYMPHOCYTES # BLD AUTO: 1.1 K/UL (ref 1–4.8)
LYMPHOCYTES NFR BLD: 19.5 % (ref 18–48)
MCH RBC QN AUTO: 21.8 PG (ref 27–31)
MCHC RBC AUTO-ENTMCNC: 27.8 G/DL (ref 32–36)
MCV RBC AUTO: 79 FL (ref 82–98)
MICROSCOPIC COMMENT: ABNORMAL
MONOCYTES # BLD AUTO: 0.3 K/UL (ref 0.3–1)
MONOCYTES NFR BLD: 6.1 % (ref 4–15)
NEUTROPHILS # BLD AUTO: 4.1 K/UL (ref 1.8–7.7)
NEUTROPHILS NFR BLD: 73.1 % (ref 38–73)
NITRITE UR QL STRIP: NEGATIVE
NRBC BLD-RTO: 0 /100 WBC
PH UR STRIP: 6 [PH] (ref 5–8)
PLATELET # BLD AUTO: 308 K/UL (ref 150–450)
PMV BLD AUTO: 10.7 FL (ref 9.2–12.9)
POC IONIZED CALCIUM: 1.11 MMOL/L (ref 1.06–1.42)
POC TCO2 (MEASURED): 27 MMOL/L (ref 23–29)
POTASSIUM BLD-SCNC: 4 MMOL/L (ref 3.5–5.1)
POTASSIUM SERPL-SCNC: 4 MMOL/L (ref 3.5–5.1)
PROT SERPL-MCNC: 8.4 G/DL (ref 6–8.4)
PROT UR QL STRIP: ABNORMAL
RBC # BLD AUTO: 4.27 M/UL (ref 4–5.4)
RBC #/AREA URNS AUTO: 17 /HPF (ref 0–4)
SAMPLE: ABNORMAL
SODIUM BLD-SCNC: 140 MMOL/L (ref 136–145)
SODIUM SERPL-SCNC: 141 MMOL/L (ref 136–145)
SP GR UR STRIP: 1.02 (ref 1–1.03)
SQUAMOUS #/AREA URNS AUTO: 11 /HPF
URN SPEC COLLECT METH UR: ABNORMAL
WBC # BLD AUTO: 5.54 K/UL (ref 3.9–12.7)
WBC #/AREA URNS AUTO: 27 /HPF (ref 0–5)

## 2023-05-31 PROCEDURE — 93005 ELECTROCARDIOGRAM TRACING: CPT

## 2023-05-31 PROCEDURE — 82330 ASSAY OF CALCIUM: CPT

## 2023-05-31 PROCEDURE — 83690 ASSAY OF LIPASE: CPT | Performed by: PHYSICIAN ASSISTANT

## 2023-05-31 PROCEDURE — 80053 COMPREHEN METABOLIC PANEL: CPT | Performed by: PHYSICIAN ASSISTANT

## 2023-05-31 PROCEDURE — 87086 URINE CULTURE/COLONY COUNT: CPT | Performed by: PHYSICIAN ASSISTANT

## 2023-05-31 PROCEDURE — 63600175 PHARM REV CODE 636 W HCPCS: Performed by: PHYSICIAN ASSISTANT

## 2023-05-31 PROCEDURE — 81025 URINE PREGNANCY TEST: CPT | Performed by: PHYSICIAN ASSISTANT

## 2023-05-31 PROCEDURE — 96375 TX/PRO/DX INJ NEW DRUG ADDON: CPT

## 2023-05-31 PROCEDURE — 81001 URINALYSIS AUTO W/SCOPE: CPT | Performed by: PHYSICIAN ASSISTANT

## 2023-05-31 PROCEDURE — 99284 EMERGENCY DEPT VISIT MOD MDM: CPT | Mod: ,,, | Performed by: EMERGENCY MEDICINE

## 2023-05-31 PROCEDURE — 96365 THER/PROPH/DIAG IV INF INIT: CPT

## 2023-05-31 PROCEDURE — 96361 HYDRATE IV INFUSION ADD-ON: CPT

## 2023-05-31 PROCEDURE — 85025 COMPLETE CBC W/AUTO DIFF WBC: CPT | Performed by: PHYSICIAN ASSISTANT

## 2023-05-31 PROCEDURE — 25000003 PHARM REV CODE 250: Performed by: PHYSICIAN ASSISTANT

## 2023-05-31 PROCEDURE — 93010 ELECTROCARDIOGRAM REPORT: CPT | Mod: ,,, | Performed by: STUDENT IN AN ORGANIZED HEALTH CARE EDUCATION/TRAINING PROGRAM

## 2023-05-31 PROCEDURE — 99284 PR EMERGENCY DEPT VISIT,LEVEL IV: ICD-10-PCS | Mod: ,,, | Performed by: EMERGENCY MEDICINE

## 2023-05-31 PROCEDURE — 93010 EKG 12-LEAD: ICD-10-PCS | Mod: ,,, | Performed by: STUDENT IN AN ORGANIZED HEALTH CARE EDUCATION/TRAINING PROGRAM

## 2023-05-31 PROCEDURE — 99284 EMERGENCY DEPT VISIT MOD MDM: CPT | Mod: 25

## 2023-05-31 PROCEDURE — 80047 BASIC METABLC PNL IONIZED CA: CPT

## 2023-05-31 RX ORDER — NITROFURANTOIN 25; 75 MG/1; MG/1
100 CAPSULE ORAL
Status: DISCONTINUED | OUTPATIENT
Start: 2023-05-31 | End: 2023-05-31

## 2023-05-31 RX ORDER — ONDANSETRON 2 MG/ML
4 INJECTION INTRAMUSCULAR; INTRAVENOUS
Status: COMPLETED | OUTPATIENT
Start: 2023-05-31 | End: 2023-05-31

## 2023-05-31 RX ORDER — NITROFURANTOIN 25; 75 MG/1; MG/1
100 CAPSULE ORAL 2 TIMES DAILY
Qty: 10 CAPSULE | Refills: 0 | Status: SHIPPED | OUTPATIENT
Start: 2023-05-31 | End: 2023-05-31 | Stop reason: ALTCHOICE

## 2023-05-31 RX ORDER — CEFDINIR 300 MG/1
300 CAPSULE ORAL 2 TIMES DAILY
Qty: 20 CAPSULE | Refills: 0 | Status: SHIPPED | OUTPATIENT
Start: 2023-05-31 | End: 2023-06-10

## 2023-05-31 RX ADMIN — ONDANSETRON 4 MG: 2 INJECTION INTRAMUSCULAR; INTRAVENOUS at 02:05

## 2023-05-31 RX ADMIN — SODIUM CHLORIDE 1000 ML: 9 INJECTION, SOLUTION INTRAVENOUS at 02:05

## 2023-05-31 RX ADMIN — CEFTRIAXONE 1 G: 1 INJECTION, POWDER, FOR SOLUTION INTRAMUSCULAR; INTRAVENOUS at 06:05

## 2023-05-31 NOTE — ED NOTES
I-STAT Chem-8+ Results:   Value Reference Range   Sodium 140 136-145 mmol/L   Potassium  4.0 3.5-5.1 mmol/L   Chloride 103  mmol/L   Ionized Calcium 1.11 1.06-1.42 mmol/L   CO2 (measured) 27 23-29 mmol/L   Glucose 97  mg/dL   BUN 14 6-30 mg/dL   Creatinine 0.8 0.5-1.4 mg/dL   Hematocrit 33 36-54%

## 2023-05-31 NOTE — ED TRIAGE NOTES
Refugio Segovia, a 42 y.o. female presents to the ED w/ complaint of near syncope.     Pt stated that she was standing at work when she felt lightheaded and dizzy like she was going to pass out. Pt stated that she was able to sit down in a chair and opened a fridge to cool off. Pt had 1 episode of n/v    Pt stated last time this happened, she had a UTI    Triage note:  Chief Complaint   Patient presents with    Weakness     Generalized weakness at work today; n/v X 1 episode     Review of patient's allergies indicates:  No Known Allergies  History reviewed. No pertinent past medical history.

## 2023-05-31 NOTE — PROVIDER PROGRESS NOTES - EMERGENCY DEPT.
Encounter Date: 5/31/2023    ED Physician Progress Notes          ED Physician Hand-off Note:    ED Course: I assumed care of patient from off-going ED IVONE,  RAFAEL Hurst.  Briefly, Patient is a 41 y/o F with episode of lightheadedness and presyncope earlier today.  Symptoms improved with IVF from EMS. Similar episodes in the past related to UTI.     At the time of signout plan was pending labs, po challenge.    No leukocytosis. Chronic, stable anemia. UPT negative. CMP unremarkable. Lipase normal. UA nitrite negative and negative leukocytes but 27 WBC and moderate bacteria on microscopic eval. Urine culture pending. Since reports history of similar symptoms in past related to UTI. Given IV rocephin in the ED. Will discharge home with omnicef.    Disposition: Discharged    She was discharged with a prescription for omnicef.  She will follow up with her PCP.  Strict ED return precautions given.  All of the patient's questions were answered.  I reviewed the patient's chart, and labs.      Final diagnoses:  [R42, R55] Postural dizziness with presyncope  [N39.0] Urinary tract infection without hematuria, site unspecified (Primary)

## 2023-05-31 NOTE — ED PROVIDER NOTES
Encounter Date: 5/31/2023       History     Chief Complaint   Patient presents with    Weakness     Generalized weakness at work today; n/v X 1 episode     42-year-old female with hypertension presents for episode of presyncope at work prior to arrival today.  She states that she was standing started to feel lightheaded and woozy as if she may pass out.  She then became nauseous and vomited once.  She reports associated diffuse abdominal pain.  She received some fluids EN route with EMS and feels better now.  She states that she is had some more symptoms in the past associated with a UTI.  She denies dysuria, hematuria, fevers/chills, flank pain, chest pain, shortness of breath headache, numbness, focal weakness or dizziness.  She did not actually lose consciousness.  No history DVT/PE, no recent surgeries or immobilization.  Not on OCPs other hormonal medications.    Review of patient's allergies indicates:  No Known Allergies  History reviewed. No pertinent past medical history.  Past Surgical History:   Procedure Laterality Date    BREAST CYST EXCISION Left 1987     Family History   Problem Relation Age of Onset    Heart disease Mother     Diabetes Father      Social History     Tobacco Use    Smoking status: Never    Smokeless tobacco: Never   Substance Use Topics    Alcohol use: Never    Drug use: Never     Review of Systems   Constitutional:  Positive for fatigue. Negative for fever.   Respiratory:  Negative for shortness of breath.    Cardiovascular:  Negative for chest pain.   Gastrointestinal:  Positive for abdominal pain, nausea and vomiting.   Genitourinary:  Negative for dysuria and hematuria.   Allergic/Immunologic: Negative for immunocompromised state.   Neurological:  Positive for light-headedness. Negative for syncope.     Physical Exam     Initial Vitals [05/31/23 1255]   BP Pulse Resp Temp SpO2   113/88 92 16 96.3 °F (35.7 °C) 98 %      MAP       --         Physical Exam    Nursing note and vitals  reviewed.  Constitutional: She is not diaphoretic. She is Obese . No distress.   HENT:   Head: Normocephalic and atraumatic.   Eyes: EOM are normal. Pupils are equal, round, and reactive to light.   Neck:   Normal range of motion.  Cardiovascular:  Normal rate, regular rhythm, normal heart sounds and intact distal pulses.     Exam reveals no gallop and no friction rub.       No murmur heard.  Pulmonary/Chest: Breath sounds normal. No respiratory distress. She has no wheezes. She has no rhonchi. She has no rales. She exhibits no tenderness.   Abdominal: Abdomen is soft. Bowel sounds are normal. She exhibits no distension and no mass. There is generalized abdominal tenderness.   Mild, generalized abdominal tenderness without rebound or guarding There is no rebound and no guarding.   Musculoskeletal:         General: Normal range of motion.      Cervical back: Normal range of motion.     Neurological: She is alert and oriented to person, place, and time.   Skin: Skin is warm and dry.   Psychiatric: She has a normal mood and affect.       ED Course   Procedures  Labs Reviewed   ISTAT PROCEDURE - Abnormal; Notable for the following components:       Result Value    POC Hematocrit 33 (*)     All other components within normal limits   URINALYSIS, REFLEX TO URINE CULTURE   CBC W/ AUTO DIFFERENTIAL   COMPREHENSIVE METABOLIC PANEL   LIPASE   POCT URINE PREGNANCY   ISTAT CHEM8     EKG Readings: (Independently Interpreted)   Initial Reading: No STEMI. Previous EKG: Compared with most recent EKG Previous EKG Date: 3/7/2023. Rhythm: Normal Sinus Rhythm. Heart Rate: 75. Ectopy: No Ectopy. ST Segments: Normal ST Segments. T Waves: Normal. Clinical Impression: Normal Sinus Rhythm     Imaging Results    None          Medications   sodium chloride 0.9% bolus 1,000 mL 1,000 mL (1,000 mLs Intravenous New Bag 5/31/23 1451)   ondansetron injection 4 mg (4 mg Intravenous Given 5/31/23 1451)     Medical Decision Making:   History:   Old  Medical Records: I decided to obtain old medical records.  Old Records Summarized: records from clinic visits and records from previous admission(s).       <> Summary of Records: Patient seen in the ED for 4/10/2023 for back pain  Initial Assessment:   42-year-old female presenting for a presyncopal episode that occurred at work.  Her vitals are normal and she is nontoxic appearing.  Differential Diagnosis:   Dehydration   Electrolyte derangement  Pregnancy  UTI   Lower clinical suspicion for dysrhythmia or other cardiac cause   She does have some vague abdominal tenderness, pancreatitis, intra-abdominal infection are possibilities felt less likely  Independently Interpreted Test(s):   I have ordered and independently interpreted EKG Reading(s) - see prior notes  Clinical Tests:   Lab Tests: Ordered and Reviewed  Medical Tests: Ordered and Reviewed  ED Management:  Check labs, EKG, give fluid, Zofran and reassess.    Symptoms improved with therapy.  Dispo pending results of workup.  Anticipate discharge.  I am signing out to Marry Lorenzo PA-C.    4:03 PM  Miley Morton PA-C               ED Course as of 05/31/23 1604   Wed May 31, 2023   1443 POC Hematocrit(!): 33 [CC]   1537 Patient reports feeling better after therapy, able to ambulate to the restroom. [CC]   1552 Preg Test, Ur: Negative [CC]      ED Course User Index  [CC] Miley Morton PA-C                 Clinical Impression:   Final diagnoses:  [R42, R55] Postural dizziness with presyncope               Miley Morton PA-C  05/31/23 1604

## 2023-06-01 ENCOUNTER — TELEPHONE (OUTPATIENT)
Dept: PRIMARY CARE CLINIC | Facility: CLINIC | Age: 43
End: 2023-06-01
Payer: MEDICAID

## 2023-06-01 NOTE — TELEPHONE ENCOUNTER
Called pt to schedule mammo, no answer LVM instructing pt to call back for assistance scheduling mammo.   Orders in appt can be scheduled.

## 2023-06-01 NOTE — TELEPHONE ENCOUNTER
----- Message from Barbie Jeronimo sent at 6/1/2023 10:44 AM CDT -----  Contact: Pt Mobile [phone]  Caller is requesting to schedule their annual screening mammogram appointment. Order is not listed in Epic.  Please enter order and contact patient to schedule.  Would the patient like a call back, or a response through their [facility] portal?:   Call

## 2023-06-02 LAB
BACTERIA UR CULT: NORMAL
BACTERIA UR CULT: NORMAL

## 2023-06-12 ENCOUNTER — OFFICE VISIT (OUTPATIENT)
Dept: PRIMARY CARE CLINIC | Facility: CLINIC | Age: 43
End: 2023-06-12
Payer: MEDICAID

## 2023-06-12 ENCOUNTER — HOSPITAL ENCOUNTER (OUTPATIENT)
Dept: RADIOLOGY | Facility: HOSPITAL | Age: 43
Discharge: HOME | End: 2023-06-12
Attending: FAMILY MEDICINE
Payer: MEDICAID

## 2023-06-12 VITALS
SYSTOLIC BLOOD PRESSURE: 138 MMHG | WEIGHT: 293 LBS | DIASTOLIC BLOOD PRESSURE: 87 MMHG | BODY MASS INDEX: 51.56 KG/M2 | HEART RATE: 78 BPM

## 2023-06-12 VITALS — WEIGHT: 293 LBS | BODY MASS INDEX: 48.82 KG/M2 | HEIGHT: 65 IN

## 2023-06-12 DIAGNOSIS — Z00.00 ANNUAL PHYSICAL EXAM: ICD-10-CM

## 2023-06-12 DIAGNOSIS — M79.605 PAIN IN BOTH LOWER EXTREMITIES: ICD-10-CM

## 2023-06-12 DIAGNOSIS — D50.9 MICROCYTIC ANEMIA: ICD-10-CM

## 2023-06-12 DIAGNOSIS — Z12.31 SCREENING MAMMOGRAM, ENCOUNTER FOR: ICD-10-CM

## 2023-06-12 DIAGNOSIS — E66.01 CLASS 3 SEVERE OBESITY WITH BODY MASS INDEX (BMI) OF 50.0 TO 59.9 IN ADULT, UNSPECIFIED OBESITY TYPE, UNSPECIFIED WHETHER SERIOUS COMORBIDITY PRESENT: ICD-10-CM

## 2023-06-12 DIAGNOSIS — N94.6 DYSMENORRHEA: Primary | ICD-10-CM

## 2023-06-12 DIAGNOSIS — M79.604 PAIN IN BOTH LOWER EXTREMITIES: ICD-10-CM

## 2023-06-12 PROCEDURE — 77063 MAMMO DIGITAL SCREENING BILAT WITH TOMO: ICD-10-PCS | Mod: 26,,, | Performed by: RADIOLOGY

## 2023-06-12 PROCEDURE — 77067 SCR MAMMO BI INCL CAD: CPT | Mod: TC

## 2023-06-12 PROCEDURE — 3075F SYST BP GE 130 - 139MM HG: CPT | Mod: CPTII,,, | Performed by: FAMILY MEDICINE

## 2023-06-12 PROCEDURE — 77067 MAMMO DIGITAL SCREENING BILAT WITH TOMO: ICD-10-PCS | Mod: 26,,, | Performed by: RADIOLOGY

## 2023-06-12 PROCEDURE — 1159F MED LIST DOCD IN RCRD: CPT | Mod: CPTII,,, | Performed by: FAMILY MEDICINE

## 2023-06-12 PROCEDURE — 3008F PR BODY MASS INDEX (BMI) DOCUMENTED: ICD-10-PCS | Mod: CPTII,,, | Performed by: FAMILY MEDICINE

## 2023-06-12 PROCEDURE — 3075F PR MOST RECENT SYSTOLIC BLOOD PRESS GE 130-139MM HG: ICD-10-PCS | Mod: CPTII,,, | Performed by: FAMILY MEDICINE

## 2023-06-12 PROCEDURE — 99213 OFFICE O/P EST LOW 20 MIN: CPT | Mod: PBBFAC,PN | Performed by: FAMILY MEDICINE

## 2023-06-12 PROCEDURE — 99214 OFFICE O/P EST MOD 30 MIN: CPT | Mod: S$PBB,,, | Performed by: FAMILY MEDICINE

## 2023-06-12 PROCEDURE — 3008F BODY MASS INDEX DOCD: CPT | Mod: CPTII,,, | Performed by: FAMILY MEDICINE

## 2023-06-12 PROCEDURE — 3079F DIAST BP 80-89 MM HG: CPT | Mod: CPTII,,, | Performed by: FAMILY MEDICINE

## 2023-06-12 PROCEDURE — 1160F PR REVIEW ALL MEDS BY PRESCRIBER/CLIN PHARMACIST DOCUMENTED: ICD-10-PCS | Mod: CPTII,,, | Performed by: FAMILY MEDICINE

## 2023-06-12 PROCEDURE — 3079F PR MOST RECENT DIASTOLIC BLOOD PRESSURE 80-89 MM HG: ICD-10-PCS | Mod: CPTII,,, | Performed by: FAMILY MEDICINE

## 2023-06-12 PROCEDURE — 99214 PR OFFICE/OUTPT VISIT, EST, LEVL IV, 30-39 MIN: ICD-10-PCS | Mod: S$PBB,,, | Performed by: FAMILY MEDICINE

## 2023-06-12 PROCEDURE — 77063 BREAST TOMOSYNTHESIS BI: CPT | Mod: 26,,, | Performed by: RADIOLOGY

## 2023-06-12 PROCEDURE — 99999 PR PBB SHADOW E&M-EST. PATIENT-LVL III: ICD-10-PCS | Mod: PBBFAC,,, | Performed by: FAMILY MEDICINE

## 2023-06-12 PROCEDURE — 77067 SCR MAMMO BI INCL CAD: CPT | Mod: 26,,, | Performed by: RADIOLOGY

## 2023-06-12 PROCEDURE — 99999 PR PBB SHADOW E&M-EST. PATIENT-LVL III: CPT | Mod: PBBFAC,,, | Performed by: FAMILY MEDICINE

## 2023-06-12 PROCEDURE — 1159F PR MEDICATION LIST DOCUMENTED IN MEDICAL RECORD: ICD-10-PCS | Mod: CPTII,,, | Performed by: FAMILY MEDICINE

## 2023-06-12 PROCEDURE — 1160F RVW MEDS BY RX/DR IN RCRD: CPT | Mod: CPTII,,, | Performed by: FAMILY MEDICINE

## 2023-06-12 RX ORDER — NAPROXEN 500 MG/1
500 TABLET ORAL 2 TIMES DAILY PRN
Qty: 60 TABLET | Refills: 2 | Status: SHIPPED | OUTPATIENT
Start: 2023-06-12

## 2023-06-12 NOTE — PROGRESS NOTES
Clinic Note  6/12/2023      Subjective:       Patient ID:  Refugio is a 42 y.o. female being seen for an established visit.    Chief Complaint: ER F/u    ER f/u - had episode of presyncope on 5/31 and went to the emergency room.  Was given fluids and antibiotics for UTI, since then patient has been feeling much better.  Had an unremarkable workup in the ER otherwise including CBC, CMP, lipase, negative urine pregnancy test.    Dysmenorrhea-patient reports regular menstrual cycles, menstruation lasting for 8-9 days.  Has had prolonged cycles for many years now.  However having new dysmenorrhea over the last 6 months towards day 8 or day 9.  Ibuprofen helps some, however has been out of a refill.    Microcytic anemia-is supposed to take iron pills but not taking    Bilateral leg pain-patient reports restarting work again where she is standing up for 6 hours a day 3 days a week.  Has significant aching/pain of her bilateral legs.    Obesity-would like to see a dietitian about this      Review of Systems   Constitutional:  Negative for chills, fever, malaise/fatigue and weight loss.   HENT:  Negative for congestion, sinus pain and sore throat.    Respiratory:  Negative for cough, shortness of breath and wheezing.    Cardiovascular:  Negative for chest pain and palpitations.   Gastrointestinal:  Negative for constipation, diarrhea, nausea and vomiting.   Genitourinary:  Negative for dysuria, frequency and urgency.   Musculoskeletal:  Positive for joint pain. Negative for back pain, falls, myalgias and neck pain.   Skin:  Negative for rash.   Neurological:  Negative for headaches.     Medication List with Changes/Refills   Current Medications    FERROUS SULFATE 325 (65 FE) MG EC TABLET    Take 1 tablet (325 mg total) by mouth every other day.    IBUPROFEN (ADVIL,MOTRIN) 400 MG TABLET    Take 1 tablet (400 mg total) by mouth every 4 (four) hours as needed for Other (Take one tablet with every meal during heavy  "bleeding).       Patient Active Problem List   Diagnosis    ASCUS favor dysplasia    Hypertension    Obesity    Obstructive sleep apnea syndrome    Impaired range of motion of right knee    Patellofemoral pain syndrome of both knees    Laceration           Objective:      /87 (BP Location: Right arm, Patient Position: Sitting, BP Method: Medium (Automatic))   Pulse 78   Wt (!) 140.5 kg (309 lb 13.7 oz)   BMI 51.56 kg/m²   Estimated body mass index is 51.56 kg/m² as calculated from the following:    Height as of 4/10/23: 5' 5" (1.651 m).    Weight as of this encounter: 140.5 kg (309 lb 13.7 oz).  Physical Exam  Vitals reviewed.   Constitutional:       General: She is not in acute distress.     Appearance: She is obese. She is not diaphoretic.   HENT:      Head: Normocephalic and atraumatic.   Eyes:      Conjunctiva/sclera: Conjunctivae normal.   Cardiovascular:      Rate and Rhythm: Normal rate and regular rhythm.      Heart sounds: Normal heart sounds.   Pulmonary:      Effort: Pulmonary effort is normal. No respiratory distress.      Breath sounds: Normal breath sounds. No wheezing.   Abdominal:      General: Bowel sounds are normal.      Palpations: Abdomen is soft.   Musculoskeletal:         General: Normal range of motion.      Cervical back: Normal range of motion.   Skin:     General: Skin is warm and dry.      Findings: No erythema or rash.   Neurological:      Mental Status: She is alert and oriented to person, place, and time.   Psychiatric:         Mood and Affect: Mood and affect normal.         Behavior: Behavior normal.         Thought Content: Thought content normal.         Judgment: Judgment normal.         Assessment and Plan:     1. Dysmenorrhea  - naproxen (NAPROSYN) 500 MG tablet; Take 1 tablet (500 mg total) by mouth 2 (two) times daily as needed (pain (take with food)).  Dispense: 60 tablet; Refill: 2  - US Pelvis Complete Non OB; Future  - Ambulatory referral/consult to Obstetrics / " Gynecology; Future    2. Microcytic anemia  - checking iron levels, continue iron supplements daily    3. Pain in both lower extremities  - likely multifactorial including standing all day long, possible arthritis, obesity.  Recommend wearing compression stockings daily    4. Class 3 severe obesity with body mass index (BMI) of 50.0 to 59.9 in adult, unspecified obesity type, unspecified whether serious comorbidity present  - Ambulatory referral/consult to Nutrition Services; Future    5. Annual physical exam  - Lipid Panel; Future  - Hemoglobin A1C; Future  - TSH; Future  - Ferritin; Future    6. Screening mammogram, encounter for  - Mammo Digital Screening Bilat w/ Jero; Future      Follow Up:   No follow-ups on file.    Other Orders Placed This Visit:  No orders of the defined types were placed in this encounter.        Capo Nieto MD        This note is dictated on M*Modal word recognition program.  There are word recognition mistakes that are occasionally missed on review.

## 2023-06-30 ENCOUNTER — HOSPITAL ENCOUNTER (OUTPATIENT)
Dept: RADIOLOGY | Facility: HOSPITAL | Age: 43
Discharge: HOME | End: 2023-06-30
Attending: FAMILY MEDICINE
Payer: MEDICAID

## 2023-06-30 DIAGNOSIS — N94.6 DYSMENORRHEA: ICD-10-CM

## 2023-06-30 PROCEDURE — 76830 TRANSVAGINAL US NON-OB: CPT | Mod: 26,,, | Performed by: STUDENT IN AN ORGANIZED HEALTH CARE EDUCATION/TRAINING PROGRAM

## 2023-06-30 PROCEDURE — 76830 US PELVIS COMP WITH TRANSVAG NON-OB (XPD): ICD-10-PCS | Mod: 26,,, | Performed by: STUDENT IN AN ORGANIZED HEALTH CARE EDUCATION/TRAINING PROGRAM

## 2023-06-30 PROCEDURE — 76856 US EXAM PELVIC COMPLETE: CPT | Mod: TC

## 2023-06-30 PROCEDURE — 76856 US EXAM PELVIC COMPLETE: CPT | Mod: 26,,, | Performed by: STUDENT IN AN ORGANIZED HEALTH CARE EDUCATION/TRAINING PROGRAM

## 2023-06-30 PROCEDURE — 76856 US PELVIS COMP WITH TRANSVAG NON-OB (XPD): ICD-10-PCS | Mod: 26,,, | Performed by: STUDENT IN AN ORGANIZED HEALTH CARE EDUCATION/TRAINING PROGRAM

## 2023-07-19 RX ORDER — FERROUS SULFATE 325(65) MG
325 TABLET, DELAYED RELEASE (ENTERIC COATED) ORAL DAILY
Qty: 60 TABLET | Refills: 1 | Status: SHIPPED | OUTPATIENT
Start: 2023-07-19

## 2023-08-10 ENCOUNTER — NUTRITION (OUTPATIENT)
Dept: NUTRITION | Facility: CLINIC | Age: 43
End: 2023-08-10
Payer: MEDICAID

## 2023-08-10 VITALS — BODY MASS INDEX: 48.82 KG/M2 | WEIGHT: 293 LBS | HEIGHT: 65 IN

## 2023-08-10 DIAGNOSIS — Z71.3 DIETARY COUNSELING: ICD-10-CM

## 2023-08-10 DIAGNOSIS — E66.01 CLASS 3 SEVERE OBESITY WITH BODY MASS INDEX (BMI) OF 50.0 TO 59.9 IN ADULT, UNSPECIFIED OBESITY TYPE, UNSPECIFIED WHETHER SERIOUS COMORBIDITY PRESENT: Primary | ICD-10-CM

## 2023-08-10 PROCEDURE — 99999 PR PBB SHADOW E&M-EST. PATIENT-LVL III: ICD-10-PCS | Mod: PBBFAC,,,

## 2023-08-10 PROCEDURE — 99213 OFFICE O/P EST LOW 20 MIN: CPT | Mod: PBBFAC

## 2023-08-10 PROCEDURE — 99999PBSHW PR PBB SHADOW TECHNICAL ONLY FILED TO HB: ICD-10-PCS | Mod: PBBFAC,,,

## 2023-08-10 PROCEDURE — 97802 MEDICAL NUTRITION INDIV IN: CPT | Mod: PBBFAC | Performed by: DIETITIAN, REGISTERED

## 2023-08-10 PROCEDURE — 99999 PR PBB SHADOW E&M-EST. PATIENT-LVL III: CPT | Mod: PBBFAC,,,

## 2023-08-10 PROCEDURE — 99999PBSHW PR PBB SHADOW TECHNICAL ONLY FILED TO HB: Mod: PBBFAC,,,

## 2023-08-10 NOTE — PROGRESS NOTES
"Referring Physician:Capo Nieto MD     Reason for visit:  Chief Complaint   Patient presents with    Obesity    Nutrition Counseling      Initial Visit    :1980     Allergies Reviewed  Meds Reviewed    Anthropometrics  Weight:(!) 143.2 kg (315 lb 11.2 oz)  Height:5' 5" (1.651 m)  BMI:Body mass index is 52.54 kg/m².   IBW: 57.0 kg  +/-10%    Meds:  Outpatient Medications Prior to Visit   Medication Sig Dispense Refill    ferrous sulfate 325 (65 FE) MG EC tablet Take 1 tablet (325 mg total) by mouth once daily. 60 tablet 1    naproxen (NAPROSYN) 500 MG tablet Take 1 tablet (500 mg total) by mouth 2 (two) times daily as needed (pain (take with food)). 60 tablet 2     No facility-administered medications prior to visit.       Food/Drug Interactions Noted:  n/a    Vitamins/Supplements/Herbs:  Iron    Labs: reviewed     Nutrition Prescription:  1710 Kcals/day( 30 kcal/kg IBW),  46 g protein( 0.8 g/kg IBW)     Support System:   pt lives alone; does her own grocery shopping and cooking    Diet Hx:   pt states she sleeps late, and her first meal of the day is usually around 3-4 pm.  States she has cut back on cold drinks; now tries to drink one 16 oz regular soda a day.  Also drinks fruit drinks and strawberry lemonade.  May have night snack of cereal and milk.  Doesn't drink regular milk; uses evaporated milk.  States she eats "a lot" of canned pineapple and kiwi.  Doesn't read food labels; does have measuring cups and spoons.    Breakfast:  doesn't eat  Lunch:  cereal (Cinnamon Toast Crunch or Capn Crunch) with evaporated milk.  Or sandwich on honey wheat:  turkey & swiss or provolone cheese with lite [place], or water pack tuna.  Occasionally has plain chips with sandwich.  Fruit drink or cold drink.  Dinner:   last night ate out:  grilled pork chop, butter beans, salad with italian dressing. Strawberry lemonade.  At home will cook baked chicken or fish with vegetable ie corn, broccoli, spinach and have a salad " with italian dressing.  HS snack:  bowl of cereal with evaporated milk    Current activity level and/or physical limitations:  has started doing some chair/bed exercises    Motivation to make changes/anticipated barriers and/or expected adherence:  pt states she will try to implement some of the suggestions made today, ie avoid sugary beverages and portion control.    Nutrition-Focus Physical Findings:  pt appears well nourished    Assessment:  pt attentive and asked relevant questions about foods/beverages recommended and to avoid; how to season foods without using salt/is sodium restriction necessary for me?; sample meal plan and menus; portion control; healthy snacking; grocery shopping and cooking tips; reading food labels; role of exercise in weight management; setting realistic weight loss goals.  All of her questions were answered, and she verbalized understanding of information.    Nutrition Diagnosis: obesity RT excess energy intake and physical inactivity AEB BMI 52.5    Recommendations:  1500 calorie, low fat, high fiber diet. Avoid all sugary beverages Exercise goal:  30 minutes per day,3 -5 days per   week.  Handouts provided and reviewed:  My Plate Planner; Cardiac-TLC Nutrition Therapy; 1500 Calorie Sample 5-Day Menus; Servings of Carbohydrates for Meal Planning; Reading A Food Label; Tips to Support Weight Loss; Weight Loss Tips, Heart-Healthy Cooking Tips, Heart Healthy Shopping Tips;  Eat Fit Plan...Anytime/Anywhere; Shop Fit-Get Fit Shopping List; [facility] Snack List for Diabetes; Exercise & Weight-Losing It and Keeping It Off (NLA); Achieving A Healthy Weight (NLA); Setting Goals for Weight Management; Acceptable Salt-free Seasoning Blends; Sodium-free Flavoring Tips    Strategies Implemented:  portion control; read food labels    Consultation Time:30 minutes. (Note:  pt arrived at 9:55 for 9:30 am appointment)  Communicated with referring healthcare provider:  Consult note available in pt's Epic  chart per MD discretion  Follow Up:  Pt provided with dietitian contact number and advised to call with questions or make future appointment if further intervention needed.

## 2023-08-10 NOTE — PATIENT INSTRUCTIONS
1500 calorie, low fat, high fiber diet.  Avoid all sugary beverages  Exercise goal:  30 minutes per day,3 -5 days per week.

## 2024-04-03 ENCOUNTER — PATIENT MESSAGE (OUTPATIENT)
Dept: ADMINISTRATIVE | Facility: HOSPITAL | Age: 44
End: 2024-04-03

## 2024-04-07 ENCOUNTER — HOSPITAL ENCOUNTER (EMERGENCY)
Facility: HOSPITAL | Age: 44
Discharge: HOME | End: 2024-04-07
Attending: STUDENT IN AN ORGANIZED HEALTH CARE EDUCATION/TRAINING PROGRAM

## 2024-04-07 VITALS
BODY MASS INDEX: 54.08 KG/M2 | HEART RATE: 96 BPM | TEMPERATURE: 98 F | SYSTOLIC BLOOD PRESSURE: 151 MMHG | RESPIRATION RATE: 16 BRPM | OXYGEN SATURATION: 98 % | WEIGHT: 293 LBS | DIASTOLIC BLOOD PRESSURE: 80 MMHG

## 2024-04-07 DIAGNOSIS — U07.1 COVID-19: Primary | ICD-10-CM

## 2024-04-07 DIAGNOSIS — R05.9 COUGH: ICD-10-CM

## 2024-04-07 DIAGNOSIS — R06.02 SHORTNESS OF BREATH: ICD-10-CM

## 2024-04-07 LAB
ALBUMIN SERPL BCP-MCNC: 3.4 G/DL (ref 3.5–5.2)
ALP SERPL-CCNC: 38 U/L (ref 55–135)
ALT SERPL W/O P-5'-P-CCNC: 9 U/L (ref 10–44)
ANION GAP SERPL CALC-SCNC: 6 MMOL/L (ref 8–16)
AST SERPL-CCNC: 13 U/L (ref 10–40)
B-HCG UR QL: NEGATIVE
BASOPHILS # BLD AUTO: 0.02 K/UL (ref 0–0.2)
BASOPHILS NFR BLD: 0.3 % (ref 0–1.9)
BILIRUB SERPL-MCNC: 0.4 MG/DL (ref 0.1–1)
BILIRUB UR QL STRIP: NEGATIVE
BNP SERPL-MCNC: 75 PG/ML (ref 0–99)
BUN SERPL-MCNC: 7 MG/DL (ref 6–20)
CALCIUM SERPL-MCNC: 9.3 MG/DL (ref 8.7–10.5)
CHLORIDE SERPL-SCNC: 103 MMOL/L (ref 95–110)
CLARITY UR REFRACT.AUTO: ABNORMAL
CO2 SERPL-SCNC: 27 MMOL/L (ref 23–29)
COLOR UR AUTO: YELLOW
CREAT SERPL-MCNC: 0.8 MG/DL (ref 0.5–1.4)
CTP QC/QA: YES
D DIMER PPP IA.FEU-MCNC: 0.54 MG/L FEU
DIFFERENTIAL METHOD BLD: ABNORMAL
EOSINOPHIL # BLD AUTO: 0 K/UL (ref 0–0.5)
EOSINOPHIL NFR BLD: 0.3 % (ref 0–8)
ERYTHROCYTE [DISTWIDTH] IN BLOOD BY AUTOMATED COUNT: 16.7 % (ref 11.5–14.5)
EST. GFR  (NO RACE VARIABLE): >60 ML/MIN/1.73 M^2
GLUCOSE SERPL-MCNC: 89 MG/DL (ref 70–110)
GLUCOSE UR QL STRIP: NEGATIVE
HCT VFR BLD AUTO: 34.4 % (ref 37–48.5)
HGB BLD-MCNC: 9.9 G/DL (ref 12–16)
HGB UR QL STRIP: NEGATIVE
IMM GRANULOCYTES # BLD AUTO: 0.03 K/UL (ref 0–0.04)
IMM GRANULOCYTES NFR BLD AUTO: 0.4 % (ref 0–0.5)
INFLUENZA A, MOLECULAR: NOT DETECTED
INFLUENZA B, MOLECULAR: NOT DETECTED
KETONES UR QL STRIP: NEGATIVE
LEUKOCYTE ESTERASE UR QL STRIP: NEGATIVE
LYMPHOCYTES # BLD AUTO: 1.1 K/UL (ref 1–4.8)
LYMPHOCYTES NFR BLD: 14.9 % (ref 18–48)
MCH RBC QN AUTO: 23.6 PG (ref 27–31)
MCHC RBC AUTO-ENTMCNC: 28.8 G/DL (ref 32–36)
MCV RBC AUTO: 82 FL (ref 82–98)
MONOCYTES # BLD AUTO: 0.7 K/UL (ref 0.3–1)
MONOCYTES NFR BLD: 8.9 % (ref 4–15)
NEUTROPHILS # BLD AUTO: 5.5 K/UL (ref 1.8–7.7)
NEUTROPHILS NFR BLD: 75.2 % (ref 38–73)
NITRITE UR QL STRIP: NEGATIVE
NRBC BLD-RTO: 0 /100 WBC
PH UR STRIP: 7 [PH] (ref 5–8)
PLATELET # BLD AUTO: 244 K/UL (ref 150–450)
PMV BLD AUTO: 10.9 FL (ref 9.2–12.9)
POTASSIUM SERPL-SCNC: 3.8 MMOL/L (ref 3.5–5.1)
PROT SERPL-MCNC: 8.2 G/DL (ref 6–8.4)
PROT UR QL STRIP: NEGATIVE
RBC # BLD AUTO: 4.2 M/UL (ref 4–5.4)
RSV AG BY MOLECULAR METHOD: NOT DETECTED
SARS-COV-2 RNA RESP QL NAA+PROBE: DETECTED
SODIUM SERPL-SCNC: 136 MMOL/L (ref 136–145)
SP GR UR STRIP: 1.02 (ref 1–1.03)
TROPONIN I SERPL DL<=0.01 NG/ML-MCNC: <0.006 NG/ML (ref 0–0.03)
URN SPEC COLLECT METH UR: ABNORMAL
WBC # BLD AUTO: 7.34 K/UL (ref 3.9–12.7)

## 2024-04-07 PROCEDURE — 93010 ELECTROCARDIOGRAM REPORT: CPT | Mod: ,,, | Performed by: INTERNAL MEDICINE

## 2024-04-07 PROCEDURE — 80053 COMPREHEN METABOLIC PANEL: CPT | Performed by: PHYSICIAN ASSISTANT

## 2024-04-07 PROCEDURE — 85379 FIBRIN DEGRADATION QUANT: CPT | Performed by: PHYSICIAN ASSISTANT

## 2024-04-07 PROCEDURE — 96374 THER/PROPH/DIAG INJ IV PUSH: CPT

## 2024-04-07 PROCEDURE — 84484 ASSAY OF TROPONIN QUANT: CPT | Performed by: PHYSICIAN ASSISTANT

## 2024-04-07 PROCEDURE — 93005 ELECTROCARDIOGRAM TRACING: CPT

## 2024-04-07 PROCEDURE — 0241U SARS-COV2 (COVID) WITH FLU/RSV BY PCR: CPT | Performed by: PHYSICIAN ASSISTANT

## 2024-04-07 PROCEDURE — 81025 URINE PREGNANCY TEST: CPT | Performed by: PHYSICIAN ASSISTANT

## 2024-04-07 PROCEDURE — 25000003 PHARM REV CODE 250: Performed by: PHYSICIAN ASSISTANT

## 2024-04-07 PROCEDURE — 99285 EMERGENCY DEPT VISIT HI MDM: CPT | Mod: 25

## 2024-04-07 PROCEDURE — 25500020 PHARM REV CODE 255: Performed by: STUDENT IN AN ORGANIZED HEALTH CARE EDUCATION/TRAINING PROGRAM

## 2024-04-07 PROCEDURE — 85025 COMPLETE CBC W/AUTO DIFF WBC: CPT | Performed by: PHYSICIAN ASSISTANT

## 2024-04-07 PROCEDURE — 81003 URINALYSIS AUTO W/O SCOPE: CPT | Performed by: PHYSICIAN ASSISTANT

## 2024-04-07 PROCEDURE — 83880 ASSAY OF NATRIURETIC PEPTIDE: CPT | Performed by: PHYSICIAN ASSISTANT

## 2024-04-07 PROCEDURE — 63600175 PHARM REV CODE 636 W HCPCS: Performed by: PHYSICIAN ASSISTANT

## 2024-04-07 RX ORDER — OXYMETAZOLINE HCL 0.05 %
1 SPRAY, NON-AEROSOL (ML) NASAL
Status: COMPLETED | OUTPATIENT
Start: 2024-04-07 | End: 2024-04-07

## 2024-04-07 RX ORDER — BENZONATATE 100 MG/1
100 CAPSULE ORAL 3 TIMES DAILY PRN
Qty: 20 CAPSULE | Refills: 0 | Status: SHIPPED | OUTPATIENT
Start: 2024-04-07 | End: 2024-04-17

## 2024-04-07 RX ORDER — ONDANSETRON HYDROCHLORIDE 2 MG/ML
4 INJECTION, SOLUTION INTRAVENOUS
Status: COMPLETED | OUTPATIENT
Start: 2024-04-07 | End: 2024-04-07

## 2024-04-07 RX ORDER — ONDANSETRON 4 MG/1
4 TABLET, ORALLY DISINTEGRATING ORAL EVERY 6 HOURS PRN
Qty: 15 TABLET | Refills: 0 | Status: SHIPPED | OUTPATIENT
Start: 2024-04-07

## 2024-04-07 RX ORDER — ACETAMINOPHEN 500 MG
1000 TABLET ORAL
Status: COMPLETED | OUTPATIENT
Start: 2024-04-07 | End: 2024-04-07

## 2024-04-07 RX ADMIN — IOHEXOL 100 ML: 350 INJECTION, SOLUTION INTRAVENOUS at 02:04

## 2024-04-07 RX ADMIN — SODIUM CHLORIDE 1000 ML: 9 INJECTION, SOLUTION INTRAVENOUS at 01:04

## 2024-04-07 RX ADMIN — ACETAMINOPHEN 1000 MG: 500 TABLET ORAL at 01:04

## 2024-04-07 RX ADMIN — OXYMETAZOLINE HCL 1 SPRAY: 0.05 SPRAY NASAL at 01:04

## 2024-04-07 RX ADMIN — ONDANSETRON 4 MG: 2 INJECTION INTRAMUSCULAR; INTRAVENOUS at 01:04

## 2024-04-07 NOTE — DISCHARGE INSTRUCTIONS
Diagnosis: COVID-19    Your COVID-19 test is positive.  Starting today, please self isolate for 5 days.  You do not need to be retested after this.  Do not go out in public.  Please inform any close contacts that they have been exposed. I am prescribing an experimental anti virus medicine called Molnupiravir which has been shown to decrease the length of illness and keep people out of the hospital. However, it can have some side effects, mostly nausea and vomiting.  It is still considered an experimental medicine.  This medicine has been shown to cause fetal harm, do not take this medicine if you have any chance of becoming pregnant within the next 3 months. I am prescribing medicine for cough and nausea.  You might need to take both Tylenol and ibuprofen for fever and pain. You should take Tylenol as needed for pain up to 3 grams daily which is 6 of the 500 mg extra strength tablets.  You can take to 2400 mg daily of ibuprofen in addition to this.    Please schedule a follow-up appointment with your primary care doctor after you finish your quarantine.  If you start to have shortness of breath, chest pain, passing out or inability to hold down fluids please return to the emergency department.

## 2024-04-07 NOTE — Clinical Note
"Refugio"Adela Segovia was seen and treated in our emergency department on 4/7/2024.  She may return to work on 04/12/2024.       If you have any questions or concerns, please don't hesitate to call.      Miley Morton PA-C"

## 2024-04-07 NOTE — ED NOTES
Pt c/o sinus congestion x2 days ago. Chills and fever started last night. Pt c/o stomach pain that started prior to congestion.

## 2024-04-07 NOTE — ED PROVIDER NOTES
Encounter Date: 4/7/2024       History     Chief Complaint   Patient presents with    Abdominal Pain     Pt reports abdominal pain, fever, chills, abdominal pain and weakness x2 days.     Weakness     43 year old female with hypertension presents for multiple complaints.  She had some suprapubic discomfort which started a few days ago followed by sinus congestion, fever and chills.  Reports associated cough, poor appetite, nausea, urinary frequency, generalized weakness and shortness of breath.  She denies vomiting, diarrhea chest pain, vaginal discharge or sick contacts.  No recent travel.  No history DVT/PE, no recent surgeries or immobilization.  Does not take any hormonal medications.      Review of patient's allergies indicates:  No Known Allergies  History reviewed. No pertinent past medical history.  Past Surgical History:   Procedure Laterality Date    BREAST CYST EXCISION Left 1987     Family History   Problem Relation Age of Onset    Heart disease Mother     Diabetes Father      Social History     Tobacco Use    Smoking status: Never    Smokeless tobacco: Never   Substance Use Topics    Alcohol use: Never    Drug use: Never     Review of Systems    Physical Exam     Initial Vitals [04/07/24 1137]   BP Pulse Resp Temp SpO2   136/71 (!) 120 (!) 22 98.6 °F (37 °C) 98 %      MAP       --         Physical Exam    Nursing note and vitals reviewed.  Constitutional: She appears well-developed and well-nourished. She is not diaphoretic. No distress.   HENT:   Head: Normocephalic and atraumatic.   Nose: Rhinorrhea present. Right sinus exhibits no maxillary sinus tenderness and no frontal sinus tenderness. Left sinus exhibits no maxillary sinus tenderness and no frontal sinus tenderness.   Eyes: No scleral icterus.   Neck:   Normal range of motion.  Cardiovascular:  Regular rhythm, normal heart sounds and intact distal pulses.     Exam reveals no gallop and no friction rub.       No murmur heard.  Tachycardic    Pulmonary/Chest: Breath sounds normal. No respiratory distress. She has no wheezes. She has no rhonchi. She has no rales. She exhibits no tenderness.   Abdominal: Abdomen is soft. Bowel sounds are normal. She exhibits no distension and no mass. There is no abdominal tenderness. There is no rebound and no guarding.   Musculoskeletal:         General: Normal range of motion.      Cervical back: Normal range of motion.     Neurological: She is alert and oriented to person, place, and time.   Skin: Skin is warm and dry.   Psychiatric: She has a normal mood and affect.         ED Course   Procedures  Labs Reviewed   URINALYSIS, REFLEX TO URINE CULTURE - Abnormal; Notable for the following components:       Result Value    Appearance, UA Hazy (*)     All other components within normal limits    Narrative:     Specimen Source->Urine   CBC W/ AUTO DIFFERENTIAL - Abnormal; Notable for the following components:    Hemoglobin 9.9 (*)     Hematocrit 34.4 (*)     MCH 23.6 (*)     MCHC 28.8 (*)     RDW 16.7 (*)     Gran % 75.2 (*)     Lymph % 14.9 (*)     All other components within normal limits   COMPREHENSIVE METABOLIC PANEL - Abnormal; Notable for the following components:    Albumin 3.4 (*)     Alkaline Phosphatase 38 (*)     ALT 9 (*)     Anion Gap 6 (*)     All other components within normal limits   SARS-COV2 (COVID) WITH FLU/RSV BY PCR - Abnormal; Notable for the following components:    SARS-CoV2 (COVID-19) Qualitative PCR Detected (*)     All other components within normal limits   D DIMER, QUANTITATIVE - Abnormal; Notable for the following components:    D-Dimer 0.54 (*)     All other components within normal limits   B-TYPE NATRIURETIC PEPTIDE   TROPONIN I   POCT URINE PREGNANCY     EKG Readings: (Independently Interpreted)   Initial Reading: No STEMI. Previous EKG: Compared with most recent EKG Previous EKG Date: 5/31/2023. Rhythm: Sinus Tachycardia. Heart Rate: 109. Ectopy: No Ectopy. ST Segments: Normal ST  Segments. Clinical Impression: Sinus Tachycardia       Imaging Results              CTA Chest Non-Coronary (PE Studies) (Final result)  Result time 04/07/24 14:42:30      Final result by Terrance Fonseca MD (04/07/24 14:42:30)                   Impression:      No evidence of pulmonary embolism.  No acute process in the chest.      Electronically signed by: Terrance Fonseca MD  Date:    04/07/2024  Time:    14:42               Narrative:    EXAMINATION:  CTA CHEST NON CORONARY (PE STUDIES)    CLINICAL HISTORY:  Pulmonary embolism (PE) suspected, high prob;    TECHNIQUE:  Low dose axial images, sagittal and coronal reformations were obtained from the thoracic inlet to the lung bases following the IV administration of 100 mL of Omnipaque 350.  Contrast timing was optimized to evaluate the pulmonary arteries.  MIP images were performed.    COMPARISON:  CTA chest from 03/07/2023    FINDINGS:  Pulmonary Arteries: This study is adequate for the evaluation of pulmonary thromboembolism.  No evidence of pulmonary embolism to the level of the segmental arteries bilaterally.    Soft tissues of the neck:  Visualized portion of the thyroid is normal in appearance.    Thoracic aorta:  Normal in caliber and contour.  No evidence of dissection.    Heart: No cardiomegaly.  No pericardial effusion.    Lymph nodes:  No evidence of mediastinal, hilar, or axillary lymphadenopathy.    Trachea and bronchi: Patent.    Lungs:  Lungs are clear.  No focal consolidation.  No pleural effusion.  No pneumothorax.    Limited evaluation of the upper abdomen:  Findings suggestive of hepatic steatosis.    Osseous structures:  No evidence of fractures or suspicious osseous lesions.                                       Medications   oxymetazoline 0.05 % nasal spray 1 spray (1 spray Each Nostril Given 4/7/24 1308)   acetaminophen tablet 1,000 mg (1,000 mg Oral Given 4/7/24 1308)   sodium chloride 0.9% bolus 1,000 mL 1,000 mL (0 mLs Intravenous  Stopped 4/7/24 1324)   ondansetron injection 4 mg (4 mg Intravenous Given 4/7/24 1308)   iohexoL (OMNIPAQUE 350) injection 100 mL (100 mLs Intravenous Given 4/7/24 1434)     Medical Decision Making  43-year-old female presenting for fever, congestion, urinary frequency and shortness of breath.  She is tachycardic with heart rate of 120, initially tachypneic respiratory rate of 22 with otherwise normal vitals.  She appears uncomfortable but nontoxic.    Differential diagnosis:  Influenza   COVID-19  Pyelonephritis   Pneumonia   Surgical pathology, she has no abdominal tenderness on exam  Low suspicion for pulmonary embolus, suspect infectious etiology but given cardia, tachypnea will check D-dimer    Will check labs, EKG, give fluids, analgesics and reassess.    COVID-19 test is positive.  D-dimer is mildly elevated.  CTA obtained without evidence of pulmonary embolus.  Patient is feeling better after therapy.  Her tachycardia and tachypnea have resolved fluids and antipyretics.  Will treat with mildly pure of your, patient risks of fetal harm, she states there is no chance for becoming pregnant in the next several months. Stressed the importance of follow-up, strict ED return precautions given.  Patient voiced understanding and is comfortable with discharge. I discussed this patient with my supervising physician.            Amount and/or Complexity of Data Reviewed  Labs: ordered. Decision-making details documented in ED Course.  Radiology: ordered and independent interpretation performed. Decision-making details documented in ED Course.  ECG/medicine tests: ordered and independent interpretation performed.    Risk  OTC drugs.  Prescription drug management.               ED Course as of 04/07/24 1501   Sun Apr 07, 2024   1249 WBC: 7.34 [CC]   1249 Hemoglobin(!): 9.9  Chronic anemia, stable [CC]   1250 EKG with sinus tachycardia, rate 109, no STEMI [NN]   1302 D-Dimer(!): 0.54 [CC]   1303 Hemoglobin(!): 9.9  stable  [NN]   1307 BNP: 75 [CC]   1321 Leukocyte Esterase, UA: Negative [CC]   1338 SARS-CoV2 (COVID-19) Qualitative PCR(!): Detected [CC]   1408 Troponin I: <0.006 [CC]   1441 Pulse: 96 [CC]   1445 CTA Chest Non-Coronary (PE Studies)  No pulmonary embolus.  Will discharge [CC]      ED Course User Index  [CC] Miley Morton PA-C  [NN] Bonnie Pereyra MD                           Clinical Impression:  Final diagnoses:  [R06.02] Shortness of breath  [R05.9] Cough  [U07.1] COVID-19 (Primary)          ED Disposition Condition    Discharge Stable          ED Prescriptions       Medication Sig Dispense Start Date End Date Auth. Provider    ondansetron (ZOFRAN-ODT) 4 MG TbDL Take 1 tablet (4 mg total) by mouth every 6 (six) hours as needed (Nausea). 15 tablet 4/7/2024 -- Miley Morton PA-C    benzonatate (TESSALON) 100 MG capsule Take 1 capsule (100 mg total) by mouth 3 (three) times daily as needed for Cough. 20 capsule 4/7/2024 4/17/2024 Miley Morton PA-C    molnupiravir 200 mg capsule (EUA) Take 4 capsules (800 mg total) by mouth every 12 (twelve) hours. for 5 days 40 capsule 4/7/2024 4/12/2024 Miley Morton PA-C          Follow-up Information       Follow up With Specialties Details Why Contact Info    Capo Nieto MD Family Medicine Schedule an appointment as soon as possible for a visit in 1 week  [address]  [facility] [address]  [phone]      [facility] - Emergency Dept Emergency Medicine Go to  If symptoms worsen [address]  [facility] [address] [phone]             Miley Morton PA-C  04/07/24 2889

## 2024-04-08 LAB
OHS QRS DURATION: 70 MS
OHS QTC CALCULATION: 441 MS

## 2024-07-09 ENCOUNTER — PATIENT MESSAGE (OUTPATIENT)
Dept: ADMINISTRATIVE | Facility: HOSPITAL | Age: 44
End: 2024-07-09

## 2024-07-29 ENCOUNTER — HOSPITAL ENCOUNTER (EMERGENCY)
Facility: HOSPITAL | Age: 44
Discharge: HOME | End: 2024-07-29
Attending: EMERGENCY MEDICINE
Payer: MEDICAID

## 2024-07-29 VITALS
DIASTOLIC BLOOD PRESSURE: 82 MMHG | OXYGEN SATURATION: 97 % | HEIGHT: 66 IN | TEMPERATURE: 98 F | BODY MASS INDEX: 47.09 KG/M2 | SYSTOLIC BLOOD PRESSURE: 128 MMHG | HEART RATE: 79 BPM | WEIGHT: 293 LBS | RESPIRATION RATE: 16 BRPM

## 2024-07-29 DIAGNOSIS — M25.572 BILATERAL ANKLE PAIN, UNSPECIFIED CHRONICITY: Primary | ICD-10-CM

## 2024-07-29 DIAGNOSIS — M25.571 BILATERAL ANKLE PAIN, UNSPECIFIED CHRONICITY: Primary | ICD-10-CM

## 2024-07-29 LAB
B-HCG UR QL: NEGATIVE
CTP QC/QA: YES

## 2024-07-29 PROCEDURE — 96372 THER/PROPH/DIAG INJ SC/IM: CPT | Performed by: PHYSICIAN ASSISTANT

## 2024-07-29 PROCEDURE — 81025 URINE PREGNANCY TEST: CPT | Performed by: PHYSICIAN ASSISTANT

## 2024-07-29 PROCEDURE — 99284 EMERGENCY DEPT VISIT MOD MDM: CPT | Mod: 25

## 2024-07-29 PROCEDURE — 63600175 PHARM REV CODE 636 W HCPCS: Performed by: PHYSICIAN ASSISTANT

## 2024-07-29 RX ORDER — NAPROXEN 500 MG/1
500 TABLET ORAL 2 TIMES DAILY WITH MEALS
Qty: 60 TABLET | Refills: 0 | Status: SHIPPED | OUTPATIENT
Start: 2024-07-29

## 2024-07-29 RX ORDER — KETOROLAC TROMETHAMINE 30 MG/ML
15 INJECTION, SOLUTION INTRAMUSCULAR; INTRAVENOUS
Status: COMPLETED | OUTPATIENT
Start: 2024-07-29 | End: 2024-07-29

## 2024-07-29 RX ADMIN — KETOROLAC TROMETHAMINE 15 MG: 30 INJECTION, SOLUTION INTRAMUSCULAR at 03:07

## 2024-07-29 NOTE — FIRST PROVIDER EVALUATION
"Medical screening exam completed.  I have conducted a focused provider triage encounter, findings are as follows:    Brief history of present illness:  Here for right ankle pain. Denies injury to the area. Reports associated right ankle swelling. She states now the left ankle hurts too. No fever.     Vitals:    07/29/24 1352   BP: (!) 159/80   Pulse: 93   Resp: 16   Temp: 98.5 °F (36.9 °C)   TempSrc: Oral   SpO2: 99%   Weight: (!) 156.5 kg (345 lb)   Height: 5' 6" (1.676 m)       Pertinent physical exam:  Well appearing, ambulatory, no acute distress, non-labored    Brief workup plan:  Ankle XR    Preliminary workup initiated; this workup will be continued and followed by the physician or advanced practice provider that is assigned to the patient when roomed.   "

## 2024-07-29 NOTE — ED TRIAGE NOTES
Refugio Segovia, a 43 y.o. female presents to the ED w/ complaint of bilateral ankle pain and swelling, worst in right ankle. Pain started on Friday, while standing for hours. Pt reports intermittent numbness and tingling to both ankles. Denies and trauma or injury. Able to bear weight and ambulating without assistance. Pt denies any other symptoms at this time     Triage note:  Chief Complaint   Patient presents with    Ankle Pain     Denies injury both ankles with pain and swelling     Review of patient's allergies indicates:  No Known Allergies  No past medical history on file.      APPEARANCE: awake and alert in NAD. PAIN  8/10  SKIN: warm, dry and intact. No breakdown or bruising.  MUSCULOSKELETAL: Pt reports Bilateral ankle pain and swelling. Patient moving all extremities spontaneously, no deformities noted. Ambulates independently.  RESPIRATORY: Denies shortness of breath.Respirations unlabored.   CARDIAC: Denies CP, 2+ distal pulses; no peripheral edema  ABDOMEN: S/ND/NT, Denies nausea  : voids spontaneously, denies difficulty  Neurologic: AAO x 4; follows commands equal strength in all extremities; denies numbness/tingling. Denies dizziness

## 2024-07-29 NOTE — DISCHARGE INSTRUCTIONS
Naproxen prescribed for pain and swelling today. Take as directed. Take with meals. Do not take with other ibuprofen containing products    I recommend rest, ice, elevation of ankles     I recommend compression socks to help with swelling and aches    Follow up with your primary doctor or return to the ER for any new or worsening symptoms.

## 2024-07-29 NOTE — PROVIDER PROGRESS NOTES - EMERGENCY DEPT.
Encounter Date: 7/29/2024    ED Physician Progress Notes          ED Physician Hand-off Note:    ED Course: I assumed care of patient from off-going ED physician team. Briefly, Patient is a 42 yo F presenting with atraumatic bilateral ankle swelling.    At the time of signout plan was pending xray.     Medications given in the ED:    Medications   ketorolac injection 15 mg (15 mg Intramuscular Given 7/29/24 7406)       Imaging Results              X-Ray Ankle Complete Bilateral (Final result)  Result time 07/29/24 16:22:51      Final result by Sixto Murphy MD (07/29/24 16:22:51)                   Impression:      Bilateral ankle diffuse nonspecific soft tissue swelling without acute displaced fracture-dislocation seen at either ankle.      Electronically signed by: Sixto Murphy MD  Date:    07/29/2024  Time:    16:22               Narrative:    EXAMINATION:  XR ANKLE COMPLETE 3 VIEW BILATERAL    CLINICAL HISTORY:  Pain in right ankle and joints of right foot    TECHNIQUE:  AP, lateral and oblique views of both ankles were performed.    COMPARISON:  None    FINDINGS:  Right ankle: Bones are well mineralized.  Overall alignment is within normal limits.  No displaced fracture, dislocation or destructive osseous process.  Prominent dorsal spurs at the midfoot.  Mild degenerative change about the ankle.  Diffuse soft tissue prominence about the ankle and dorsal hindfoot suggesting swelling.  No subcutaneous emphysema or radiopaque foreign body.  Small plantar calcaneal spur and small enthesophyte at the Achilles insertion site.    Left ankle: Bones are well mineralized.  Overall alignment is within normal limits.  No displaced fracture, dislocation or destructive osseous process.  Prominent dorsal spurs at the midfoot.  Mild degenerative change about the ankle.  Soft tissue prominence about the ankle and dorsal hindfoot suggesting swelling.  No subcutaneous emphysema or radiopaque foreign body.  Tiny plantar  calcaneal spur and small enthesophyte at the Achilles insertion site.                                      Disposition: discharge    Imaging as above. Recommend leg elevation and compression stockings. Patient admits to similar symptoms in the past.  However that was when she was on her feet for long periods of time.  She states that she has not been as active as she once was.  Recommend outpatient follow-up.  Return precautions.  All questions answered.  The patient was instructed to follow up with a primary care provider or to return to the emergency department for worsening symptoms. The treatment plan was discussed with the patient who demonstrated understanding and comfort with plan. The patient's history, physical exam, and plan of care was discussed with and agreed upon with my supervising physician.     Impression:     Final diagnoses:  [M25.571, M25.572] Bilateral ankle pain, unspecified chronicity (Primary)

## 2024-07-29 NOTE — ED PROVIDER NOTES
Encounter Date: 7/29/2024       History     Chief Complaint   Patient presents with    Ankle Pain     Denies injury both ankles with pain and swelling     43 y.o. Female with a PMHx of HTN, KAVYA, obesity presents to the ED with bilateral ankle pain x4 days. It started after spending several hours on her feet cooking. She's had prior episodes with her last job which required walking/standing. Notes paresthesias after elevating her feet last night, though that has resolved. She is taking tylenol without improvement. She denies injury, fever, decrease rom, gait changes.        Review of patient's allergies indicates:  No Known Allergies  History reviewed. No pertinent past medical history.  Past Surgical History:   Procedure Laterality Date    BREAST CYST EXCISION Left 1987     Family History   Problem Relation Name Age of Onset    Heart disease Mother      Diabetes Father       Social History     Tobacco Use    Smoking status: Never    Smokeless tobacco: Never   Substance Use Topics    Alcohol use: Never    Drug use: Never     Review of Systems   Constitutional:  Negative for fever.   Musculoskeletal:  Positive for arthralgias and myalgias.   Skin:  Negative for color change and wound.       Physical Exam     Initial Vitals [07/29/24 1352]   BP Pulse Resp Temp SpO2   (!) 159/80 93 16 98.5 °F (36.9 °C) 99 %      MAP       --         Physical Exam    Nursing note and vitals reviewed.  Constitutional: She appears well-developed and well-nourished. She is not diaphoretic. No distress.   HENT:   Head: Normocephalic and atraumatic.   Nose: Nose normal.   Eyes: Conjunctivae and EOM are normal.   Neck: Neck supple.   Cardiovascular:  Normal rate.           Pulmonary/Chest: No respiratory distress.   Musculoskeletal:      Cervical back: Neck supple.      Right ankle: Swelling present. No deformity. Normal range of motion.      Left ankle: Swelling present. No deformity. Normal range of motion.      Comments: Trace swelling  localized to bilateral ankles.  No erythema, warmth, wounds, rash     Neurological: She is alert and oriented to person, place, and time. Gait normal.   Skin: No rash noted.   Psychiatric: She has a normal mood and affect. Her behavior is normal. Judgment and thought content normal.         ED Course   Procedures  Labs Reviewed   POCT URINE PREGNANCY       Result Value    POC Preg Test, Ur Negative       Acceptable Yes            Imaging Results              X-Ray Ankle Complete Bilateral (Final result)  Result time 07/29/24 16:22:51      Final result by Sixto Murphy MD (07/29/24 16:22:51)                   Impression:      Bilateral ankle diffuse nonspecific soft tissue swelling without acute displaced fracture-dislocation seen at either ankle.      Electronically signed by: Sixto Murphy MD  Date:    07/29/2024  Time:    16:22               Narrative:    EXAMINATION:  XR ANKLE COMPLETE 3 VIEW BILATERAL    CLINICAL HISTORY:  Pain in right ankle and joints of right foot    TECHNIQUE:  AP, lateral and oblique views of both ankles were performed.    COMPARISON:  None    FINDINGS:  Right ankle: Bones are well mineralized.  Overall alignment is within normal limits.  No displaced fracture, dislocation or destructive osseous process.  Prominent dorsal spurs at the midfoot.  Mild degenerative change about the ankle.  Diffuse soft tissue prominence about the ankle and dorsal hindfoot suggesting swelling.  No subcutaneous emphysema or radiopaque foreign body.  Small plantar calcaneal spur and small enthesophyte at the Achilles insertion site.    Left ankle: Bones are well mineralized.  Overall alignment is within normal limits.  No displaced fracture, dislocation or destructive osseous process.  Prominent dorsal spurs at the midfoot.  Mild degenerative change about the ankle.  Soft tissue prominence about the ankle and dorsal hindfoot suggesting swelling.  No subcutaneous emphysema or radiopaque foreign  body.  Tiny plantar calcaneal spur and small enthesophyte at the Achilles insertion site.                                       Medications   ketorolac injection 15 mg (15 mg Intramuscular Given 7/29/24 4831)     Medical Decision Making  43 y.o. Female with a PMHx of HTN, KAVYA, obesity presents to the ED with bilateral ankle pain x4 days. Nontoxic appearing. Vitals with HTN. Afebrile. Exam as above. Toradol given for pain. Will obtain imaging at this time to assess for acute processes.     Ddx:  Osteoarthritis, inflammatory arthritis, dependent edema, ankle sprain, fracture     Pending imaging at this time. I will sign out this patient to INES Draper PA-C due to shift change         Amount and/or Complexity of Data Reviewed  Labs: ordered.    Risk  Prescription drug management.                                      Clinical Impression:  Final diagnoses:  [M25.571, M25.572] Bilateral ankle pain, unspecified chronicity (Primary)          ED Disposition Condition    Discharge Stable          ED Prescriptions       Medication Sig Dispense Start Date End Date Auth. Provider    naproxen (NAPROSYN) 500 MG tablet Take 1 tablet (500 mg total) by mouth 2 (two) times daily with meals. 60 tablet 7/29/2024 -- Sadie Seo PA-C          Follow-up Information       Follow up With Specialties Details Why Contact Info    Capo Nieto MD Family Medicine Schedule an appointment as soon as possible for a visit   [address]  [facility] [address]  [phone]      [facility] - Emergency Dept Emergency Medicine  If symptoms worsen [address] [facility] [address] [phone]             Sadie Seo PA-C  07/30/24 1291

## 2024-09-11 ENCOUNTER — PATIENT OUTREACH (OUTPATIENT)
Dept: ADMINISTRATIVE | Facility: HOSPITAL | Age: 44
End: 2024-09-11
Payer: MEDICAID

## 2024-09-11 DIAGNOSIS — Z12.12 ENCOUNTER FOR COLORECTAL CANCER SCREENING: Primary | ICD-10-CM

## 2024-09-11 DIAGNOSIS — Z12.11 ENCOUNTER FOR COLORECTAL CANCER SCREENING: Primary | ICD-10-CM

## 2024-09-11 NOTE — PROGRESS NOTES
Health Maintenance Due   Topic Date Due    Cervical Cancer Screening  Never done    Pneumococcal Vaccines (Age 0-64) (1 of 2 - PCV) Never done    Colorectal Cancer Screening  Never done    Mammogram  06/12/2024    Influenza Vaccine (1) 09/01/2024    COVID-19 Vaccine (3 - 2023-24 season) 09/01/2024    The man stated wrong number.mailed reminder.

## 2024-09-11 NOTE — LETTER
September 11, 2024    Refugio Segovia  [address] [facility] [address]             Dear Refugio    In addition to helping you feel better when you are sick, we are interested in preventing illness and injury in the first place.  Screening tests and routine follow up tests when you have certain medical problems are very essential in helping you stay as healthy as possible. In the spirit of maintaining your health, our system indicates that you are due for the following:    Health Maintenance Due   Topic Date Due    Cervical Cancer Screening  Never done    Pneumococcal Vaccines (Age 0-64) (1 of 2 - PCV) Never done    Colorectal Cancer Screening  Never done    Mammogram  06/12/2024    Influenza Vaccine (1) 09/01/2024    COVID-19 Vaccine (3 - 2023-24 season) 09/01/2024      Please be prepared to discuss these recommended tests at your next visit.  If you haven't had a visit within the past one year please call [phone] to schedule or request an appointment.    Sincerely,       Your Health Care Team

## 2024-10-16 ENCOUNTER — PATIENT MESSAGE (OUTPATIENT)
Dept: ADMINISTRATIVE | Facility: HOSPITAL | Age: 44
End: 2024-10-16
Payer: MEDICAID

## 2024-11-13 ENCOUNTER — PATIENT MESSAGE (OUTPATIENT)
Dept: ADMINISTRATIVE | Facility: HOSPITAL | Age: 44
End: 2024-11-13
Payer: MEDICAID

## 2024-11-21 ENCOUNTER — PATIENT MESSAGE (OUTPATIENT)
Dept: ADMINISTRATIVE | Facility: HOSPITAL | Age: 44
End: 2024-11-21
Payer: MEDICAID

## 2024-12-03 ENCOUNTER — PATIENT OUTREACH (OUTPATIENT)
Dept: ADMINISTRATIVE | Facility: HOSPITAL | Age: 44
End: 2024-12-03
Payer: MEDICAID

## 2024-12-03 NOTE — PROGRESS NOTES
Health Maintenance Due   Topic Date Due    Cervical Cancer Screening  Never done    Pneumococcal Vaccines (Age 0-64) (1 of 2 - PCV) Never done    Colorectal Cancer Screening  Never done    Mammogram  06/12/2024    Influenza Vaccine (1) 09/01/2024    COVID-19 Vaccine (3 - 2024-25 season) 09/01/2024     LVM to contact the office , mailed reminder.

## 2024-12-03 NOTE — LETTER
December 3, 2024    Refugio Segovia  [address] [facility] [address]             Dear Refugio    In addition to helping you feel better when you are sick, we are interested in preventing illness and injury in the first place.  Screening tests and routine follow up tests when you have certain medical problems are very essential in helping you stay as healthy as possible. In the spirit of maintaining your health, our system indicates that you are due for the following:    Health Maintenance Due   Topic Date Due    Cervical Cancer Screening  Never done    Pneumococcal Vaccines (Age 0-64) (1 of 2 - PCV) Never done    Colorectal Cancer Screening  Never done    Mammogram  06/12/2024    Influenza Vaccine (1) 09/01/2024    COVID-19 Vaccine (3 - 2024-25 season) 09/01/2024        Please be prepared to discuss these recommended tests at your next visit.  If you haven't had a visit within the past one year please call [phone] to schedule or request an appointment.    Sincerely,       Your Health Care Team

## 2025-01-06 ENCOUNTER — TELEPHONE (OUTPATIENT)
Dept: ENDOSCOPY | Facility: HOSPITAL | Age: 45
End: 2025-01-06
Payer: MEDICAID

## 2025-01-06 NOTE — TELEPHONE ENCOUNTER
Contacted Pt to schedule a pre admit testing (PAT) appointment. PAT appointment scheduled for 03/07/2025 at 09:40AM, Pt verbalized understanding.

## 2025-01-08 ENCOUNTER — PATIENT MESSAGE (OUTPATIENT)
Dept: ADMINISTRATIVE | Facility: HOSPITAL | Age: 45
End: 2025-01-08
Payer: MEDICAID

## 2025-02-20 ENCOUNTER — TELEPHONE (OUTPATIENT)
Dept: PRIMARY CARE CLINIC | Facility: CLINIC | Age: 45
End: 2025-02-20
Payer: MEDICAID

## 2025-03-07 ENCOUNTER — CLINICAL SUPPORT (OUTPATIENT)
Dept: ENDOSCOPY | Facility: HOSPITAL | Age: 45
End: 2025-03-07
Payer: MEDICAID

## 2025-03-07 ENCOUNTER — TELEPHONE (OUTPATIENT)
Dept: ENDOSCOPY | Facility: HOSPITAL | Age: 45
End: 2025-03-07

## 2025-03-07 VITALS — WEIGHT: 293 LBS | BODY MASS INDEX: 47.09 KG/M2 | HEIGHT: 66 IN

## 2025-03-07 DIAGNOSIS — Z12.12 ENCOUNTER FOR COLORECTAL CANCER SCREENING: ICD-10-CM

## 2025-03-07 DIAGNOSIS — Z12.11 ENCOUNTER FOR COLORECTAL CANCER SCREENING: ICD-10-CM

## 2025-03-07 DIAGNOSIS — Z12.11 SPECIAL SCREENING FOR MALIGNANT NEOPLASMS, COLON: Primary | ICD-10-CM

## 2025-03-07 NOTE — TELEPHONE ENCOUNTER
Referral for procedure from PAT appointment      Spoke to patient to schedule procedure(s) Colonoscopy       Physician to perform procedure(s) Dr. Gan  Date of Procedure (s) 3/21/25  Arrival Time 1:30 PM  Time of Procedure(s) 2:30 PM   Location of Procedure(s) [facility]  Type of Rx Prep sent to patient: PEG  Instructions provided to patient via [facility]    Patient was informed on the following information and verbalized understanding. Screening questionnaire reviewed with patient and complete. If procedure requires anesthesia, a responsible adult needs to be present to accompany the patient home, patient cannot drive after receiving anesthesia. Appointment details are tentative, especially check-in time. Patient will receive a prep-op call 7 days prior to confirm check-in time for procedure. If applicable the patient should contact their pharmacy to verify Rx for procedure prep is ready for pick-up. Patient was advised to call the scheduling department at [phone] if pharmacy states no Rx is available. Patient was advised to call the endoscopy scheduling department if any questions or concerns arise.      [facility] Scheduling Department

## 2025-03-14 ENCOUNTER — TELEPHONE (OUTPATIENT)
Dept: ENDOSCOPY | Facility: HOSPITAL | Age: 45
End: 2025-03-14
Payer: MEDICAID

## 2025-03-18 ENCOUNTER — TELEPHONE (OUTPATIENT)
Dept: ENDOSCOPY | Facility: HOSPITAL | Age: 45
End: 2025-03-18
Payer: MEDICAID

## 2025-03-19 DIAGNOSIS — Z12.31 OTHER SCREENING MAMMOGRAM: ICD-10-CM

## 2025-04-01 ENCOUNTER — OFFICE VISIT (OUTPATIENT)
Dept: PRIMARY CARE CLINIC | Facility: CLINIC | Age: 45
End: 2025-04-01
Payer: MEDICAID

## 2025-04-01 VITALS
BODY MASS INDEX: 50.02 KG/M2 | DIASTOLIC BLOOD PRESSURE: 107 MMHG | WEIGHT: 293 LBS | OXYGEN SATURATION: 100 % | SYSTOLIC BLOOD PRESSURE: 164 MMHG | HEIGHT: 64 IN | HEART RATE: 75 BPM

## 2025-04-01 DIAGNOSIS — N92.4 EXCESSIVE BLEEDING IN PREMENOPAUSAL PERIOD: Primary | ICD-10-CM

## 2025-04-01 DIAGNOSIS — Z00.00 ANNUAL PHYSICAL EXAM: ICD-10-CM

## 2025-04-01 DIAGNOSIS — D50.9 MICROCYTIC ANEMIA: ICD-10-CM

## 2025-04-01 PROCEDURE — 99999 PR PBB SHADOW E&M-EST. PATIENT-LVL IV: CPT | Mod: PBBFAC,,, | Performed by: FAMILY MEDICINE

## 2025-04-01 PROCEDURE — 99214 OFFICE O/P EST MOD 30 MIN: CPT | Mod: PBBFAC,PN | Performed by: FAMILY MEDICINE

## 2025-04-01 RX ORDER — FERROUS SULFATE 325(65) MG
325 TABLET, DELAYED RELEASE (ENTERIC COATED) ORAL EVERY OTHER DAY
Qty: 60 TABLET | Refills: 1 | Status: SHIPPED | OUTPATIENT
Start: 2025-04-01

## 2025-04-01 NOTE — PATIENT INSTRUCTIONS
Normal blood pressure 120/80    >130/80, high blood pressure    >140/90, need to be on medications      If start on blood pressure medication, will likely start amlodipine

## 2025-04-02 NOTE — PROGRESS NOTES
Clinic Note  2025      Subjective:       Patient ID:  Refugio is a 44 y.o. female being seen for:      History of Present Illness    CHIEF COMPLAINT:  Refugio presents today for a general checkup with elevated blood pressure    HEMATOLOGIC:  She is anemic with hemoglobin levels between 8-9 g/dL. She reports craving and consuming ice, which is consistent with severe anemia.    MENSTRUAL HISTORY:  She experienced menarche at age 7. She reports recent prolonged menstrual bleeding lasting 14-15 days (-), with heavy daily bleeding and consistent large clots throughout this period.    MUSCULOSKELETAL:  She reports right heel pain occurring after prolonged standing. The pain improves over time but returns with extended periods of standing. She wears compression socks when anticipating long periods of standing.    WEIGHT MANAGEMENT:  She reports gaining 30 lbs over the past 2 years.    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    FAMILY HISTORY:  Her mother is  since . Her father is 71 years old with history of seizures. Multiple family members  at age 60, including mother and aunt.      ROS:  General: -fever, -chills, +fatigue, -weight gain, -weight loss  Eyes: -vision changes, -redness, -discharge  ENT: -ear pain, -nasal congestion, -sore throat  Cardiovascular: -chest pain, -palpitations, +lower extremity edema  Respiratory: -cough, -shortness of breath, +exertional dyspnea  Gastrointestinal: -abdominal pain, -nausea, -vomiting, -diarrhea, -constipation, -blood in stool  Genitourinary: -dysuria, -hematuria, -frequency  Musculoskeletal: -joint pain, -muscle pain, +limb pain, +limb swelling  Skin: -rash, -lesion  Neurological: -headache, -dizziness, -numbness,  "-tingling  Psychiatric: +anxiety, -depression, -sleep difficulty  Female Genitourinary: +periods lasting more than 7 days, +excessive vaginal bleeding           Medication List with Changes/Refills   Changed and/or Refilled Medications    Modified Medication Previous Medication    FERROUS SULFATE 325 (65 FE) MG EC TABLET ferrous sulfate 325 (65 FE) MG EC tablet       Take 1 tablet (325 mg total) by mouth every other day.    Take 1 tablet (325 mg total) by mouth once daily.   Discontinued Medications    NAPROXEN (NAPROSYN) 500 MG TABLET    Take 1 tablet (500 mg total) by mouth 2 (two) times daily as needed (pain (take with food)).    NAPROXEN (NAPROSYN) 500 MG TABLET    Take 1 tablet (500 mg total) by mouth 2 (two) times daily with meals.    ONDANSETRON (ZOFRAN-ODT) 4 MG TBDL    Take 1 tablet (4 mg total) by mouth every 6 (six) hours as needed (Nausea).       Problem List[1]        Objective:      BP (!) 164/107   Pulse 75   Ht 5' 4" (1.626 m)   Wt (!) 150.3 kg (331 lb 3.9 oz)   LMP 03/14/2025   SpO2 100%   BMI 56.86 kg/m²       Physical Exam    Vitals: Blood pressure does not look good.  General: No acute distress. Well-developed. Well-nourished.  Eyes: EOMI. Sclerae anicteric.  HENT: Normocephalic. Atraumatic. Nares patent. Moist oral mucosa.  Cardiovascular: Regular rate. Regular rhythm. No murmurs. No rubs. No gallops. Normal S1, S2.  Respiratory: Normal respiratory effort. Clear to auscultation bilaterally. No rales. No rhonchi. No wheezing.  Musculoskeletal: No  obvious deformity.  Extremities: No lower extremity edema.  Neurological: Alert & oriented x3. No slurred speech. Normal gait.  Psychiatric: Normal mood. Normal affect. Good insight. Good judgment.  Skin: Warm. Dry. No rash.           Assessment and Plan:       - Assessed BP as elevated, requiring follow-up and potential intervention.  - Evaluated anemia, likely due to heavy menstrual bleeding.  - Considered sleep apnea as potential contributor to " fatigue and overall health status.  - Addressing multiple interconnected health issues including HTN, anemia, obesity, and potential sleep apnea.  - Considered starting amlodipine for HTN if BP remains elevated on repeat measurement.    IRON DEFICIENCY ANEMIA:  - Monitored the patient's blood count, which is low, hovering between 8 and 9 (normal range is 12 and above).  - Noted the patient's report of craving ice, which is a sign of severe anemia.  - Assessed the patient's anemia based on last year's labs.  - Educated the patient on the importance of iron supplementation for anemia.  - Prescribed iron supplements to be taken daily with orange juice, or every other day if constipating, to aid absorption.  - Ordered complete labs including CBC and iron levels.  - Planned to check iron levels and other labs in 2 weeks.  - Discussed the possibility of blood transfusions if blood count drops below 7.    EXCESSIVE MENSTRUATION:  - Monitored the patient's report of heavy menstrual bleeding lasting 14-15 days, from March 14th to March 27th.  - Evaluated potential causes of heavy menstrual bleeding, including normal changes leading up to menopause, fibroids, or polyps.  - Educated the patient on potential causes of heavy menstrual bleeding, including the impact of excess weight.  - Referred the patient to OBGYN for evaluation of heavy menstrual bleeding, including pelvic exam, ultrasound, and possible endometrial biopsy.  - Discussed potential treatments including oral contraceptives, intrauterine device, surgical procedures, or hysterectomy as a last resort.    HYPERTENSION:  - Monitored the patient's blood pressure, which was elevated during the visit.  - Explained normal BP ranges (120/80)  - Discussed factors affecting blood pressure including sodium intake, genetics, age, and weight.  - Ordered BP check in 2 weeks to confirm if it's consistently high.  - Ordered complete labs including renal and hepatic function tests,  lipid panel.  - Discussed potential treatment with amlodipine if blood pressure remains elevated, and the possibility of multiple antihypertensive medications.  - Recommend lifestyle modifications including reducing sodium intake, increasing physical activity, and ensuring adequate sleep.  - [place] to reduce salt intake.  - [place] to start exercising, even if only for 5 minutes a day initially.    RIGHT FOOT PAIN AND EDEMA:  - Monitored the patient's report of pain and edema in the right foot, particularly in the heel, after prolonged standing.  - Evaluated that at 44 years old, the patient should be able to stand for extended periods without significant pain or edema.  - Assessed that inability to stand for long periods now may indicate future mobility issues if not addressed.  - Recommend wearing thigh-high compression stockings when anticipating prolonged periods of standing.    CAREGIVER STRESS:  - Noted that the patient has been the primary caregiver for her sister, who is emotionally and mentally challenged, for 2 years.  - Acknowledged the stress associated with being a primary caregiver for an adult family member.  - Recognized that the patient is primarily responsible for cooking for her sister.  - Suggested preparing slightly more bland food, which would be beneficial for both the patient and her sister.    FAMILY HISTORY:  - Noted the patient's report of her father's history of seizures.  - Documented the patient's family history of premature deaths, potentially related to circulatory system diseases.    GENERAL HEALTH AND FOLLOW-UP:  - Emphasized importance of current lifestyle choices on future health and mobility.  - Discussed sleep apnea, its effects on health, and potential need for sleep study.  - Ordered complete labs including diabetes screening, thyroid, blood sugar.  - Scheduled follow up in 2 weeks for BP check with nurse and lab results review.  - Follow up in 2 months to review results  and reassess.         Follow Up:   Follow up for 2 wk nursing BP visit and labs, 2 months f/u w/ Dr. Nieto.    Other Orders Placed This Visit:  Orders Placed This Encounter    US Pelvis Complete Non OB    CBC Auto Differential    Comprehensive Metabolic Panel    Hemoglobin A1C    Lipid Panel    TSH    Ferritin    Iron and TIBC    Ambulatory referral/consult to Obstetrics / Gynecology    ferrous sulfate 325 (65 FE) MG EC tablet         Capo Nieto MD        This note is dictated on M*Modal word recognition program and This note was generated with the assistance of ambient listening technology. Verbal consent was obtained by the patient and accompanying visitor(s) for the recording of patient appointment to facilitate this note. I attest to having reviewed and edited the generated note for accuracy, though some syntax or spelling errors may persist. Please contact the author of this note for any clarification.  .  There are word recognition mistakes that are occasionally missed on review.         [1]   Patient Active Problem List  Diagnosis    ASCUS favor dysplasia    Hypertension    Obesity    Obstructive sleep apnea syndrome    Impaired range of motion of right knee    Patellofemoral pain syndrome of both knees    Laceration

## 2025-04-07 ENCOUNTER — PATIENT MESSAGE (OUTPATIENT)
Dept: ADMINISTRATIVE | Facility: HOSPITAL | Age: 45
End: 2025-04-07
Payer: MEDICAID

## 2025-04-15 ENCOUNTER — LAB VISIT (OUTPATIENT)
Dept: PRIMARY CARE CLINIC | Facility: CLINIC | Age: 45
End: 2025-04-15
Payer: MEDICAID

## 2025-04-15 ENCOUNTER — CLINICAL SUPPORT (OUTPATIENT)
Dept: PRIMARY CARE CLINIC | Facility: CLINIC | Age: 45
End: 2025-04-15
Payer: MEDICAID

## 2025-04-15 VITALS — DIASTOLIC BLOOD PRESSURE: 97 MMHG | HEART RATE: 82 BPM | OXYGEN SATURATION: 100 % | SYSTOLIC BLOOD PRESSURE: 152 MMHG

## 2025-04-15 DIAGNOSIS — Z00.00 ANNUAL PHYSICAL EXAM: ICD-10-CM

## 2025-04-15 DIAGNOSIS — D50.9 MICROCYTIC ANEMIA: ICD-10-CM

## 2025-04-15 DIAGNOSIS — I10 HYPERTENSION, UNSPECIFIED TYPE: Primary | ICD-10-CM

## 2025-04-15 LAB
ABSOLUTE EOSINOPHIL (OHS): 0.04 K/UL
ABSOLUTE MONOCYTE (OHS): 0.32 K/UL (ref 0.3–1)
ABSOLUTE NEUTROPHIL COUNT (OHS): 2.6 K/UL (ref 1.8–7.7)
ALBUMIN SERPL BCP-MCNC: 3.2 G/DL (ref 3.5–5.2)
ALP SERPL-CCNC: 40 UNIT/L (ref 40–150)
ALT SERPL W/O P-5'-P-CCNC: 9 UNIT/L (ref 10–44)
ANION GAP (OHS): 6 MMOL/L (ref 8–16)
AST SERPL-CCNC: 15 UNIT/L (ref 11–45)
BASOPHILS # BLD AUTO: 0.01 K/UL
BASOPHILS NFR BLD AUTO: 0.2 %
BILIRUB SERPL-MCNC: 0.2 MG/DL (ref 0.1–1)
BUN SERPL-MCNC: 8 MG/DL (ref 6–20)
CALCIUM SERPL-MCNC: 8.5 MG/DL (ref 8.7–10.5)
CHLORIDE SERPL-SCNC: 107 MMOL/L (ref 95–110)
CHOLEST SERPL-MCNC: 119 MG/DL (ref 120–199)
CHOLEST/HDLC SERPL: 2 {RATIO} (ref 2–5)
CO2 SERPL-SCNC: 27 MMOL/L (ref 23–29)
CREAT SERPL-MCNC: 0.8 MG/DL (ref 0.5–1.4)
EAG (OHS): 108 MG/DL (ref 68–131)
ERYTHROCYTE [DISTWIDTH] IN BLOOD BY AUTOMATED COUNT: 17.2 % (ref 11.5–14.5)
FERRITIN SERPL-MCNC: 6 NG/ML (ref 20–300)
GFR SERPLBLD CREATININE-BSD FMLA CKD-EPI: >60 ML/MIN/1.73/M2
GLUCOSE SERPL-MCNC: 82 MG/DL (ref 70–110)
HBA1C MFR BLD: 5.4 % (ref 4–5.6)
HCT VFR BLD AUTO: 31.5 % (ref 37–48.5)
HDLC SERPL-MCNC: 60 MG/DL (ref 40–75)
HDLC SERPL: 50.4 % (ref 20–50)
HGB BLD-MCNC: 8.6 GM/DL (ref 12–16)
IMM GRANULOCYTES # BLD AUTO: 0.01 K/UL (ref 0–0.04)
IMM GRANULOCYTES NFR BLD AUTO: 0.2 % (ref 0–0.5)
IRON SATN MFR SERPL: 5 % (ref 20–50)
IRON SERPL-MCNC: 21 UG/DL (ref 30–160)
LDLC SERPL CALC-MCNC: 52.4 MG/DL (ref 63–159)
LYMPHOCYTES # BLD AUTO: 1.11 K/UL (ref 1–4.8)
MCH RBC QN AUTO: 22.6 PG (ref 27–31)
MCHC RBC AUTO-ENTMCNC: 27.3 G/DL (ref 32–36)
MCV RBC AUTO: 83 FL (ref 82–98)
NONHDLC SERPL-MCNC: 59 MG/DL
NUCLEATED RBC (/100WBC) (OHS): 0 /100 WBC
PLATELET # BLD AUTO: 256 K/UL (ref 150–450)
PMV BLD AUTO: 10.6 FL (ref 9.2–12.9)
POTASSIUM SERPL-SCNC: 4 MMOL/L (ref 3.5–5.1)
PROT SERPL-MCNC: 7.7 GM/DL (ref 6–8.4)
RBC # BLD AUTO: 3.8 M/UL (ref 4–5.4)
RELATIVE EOSINOPHIL (OHS): 1 %
RELATIVE LYMPHOCYTE (OHS): 27.1 % (ref 18–48)
RELATIVE MONOCYTE (OHS): 7.8 % (ref 4–15)
RELATIVE NEUTROPHIL (OHS): 63.7 % (ref 38–73)
SODIUM SERPL-SCNC: 140 MMOL/L (ref 136–145)
TIBC SERPL-MCNC: 397 UG/DL (ref 250–450)
TRANSFERRIN SERPL-MCNC: 268 MG/DL (ref 200–375)
TRIGL SERPL-MCNC: 33 MG/DL (ref 30–150)
TSH SERPL-ACNC: 0.96 UIU/ML (ref 0.4–4)
WBC # BLD AUTO: 4.09 K/UL (ref 3.9–12.7)

## 2025-04-15 PROCEDURE — 83036 HEMOGLOBIN GLYCOSYLATED A1C: CPT

## 2025-04-15 PROCEDURE — 85025 COMPLETE CBC W/AUTO DIFF WBC: CPT

## 2025-04-15 PROCEDURE — 84443 ASSAY THYROID STIM HORMONE: CPT

## 2025-04-15 PROCEDURE — 83540 ASSAY OF IRON: CPT

## 2025-04-15 PROCEDURE — 80053 COMPREHEN METABOLIC PANEL: CPT

## 2025-04-15 PROCEDURE — 82728 ASSAY OF FERRITIN: CPT

## 2025-04-15 PROCEDURE — 80061 LIPID PANEL: CPT

## 2025-04-15 PROCEDURE — 99999 PR PBB SHADOW E&M-EST. PATIENT-LVL II: CPT | Mod: PBBFAC,,,

## 2025-04-15 PROCEDURE — 99212 OFFICE O/P EST SF 10 MIN: CPT | Mod: PBBFAC,PN

## 2025-04-15 NOTE — PROGRESS NOTES
Refugio Segovia 44 y.o. female is here today for Blood Pressure check.   History of HTN no.    Review of patient's allergies indicates:  No Known Allergies  Creatinine   Date Value Ref Range Status   04/07/2024 0.8 0.5 - 1.4 mg/dL Final     Sodium   Date Value Ref Range Status   04/07/2024 136 136 - 145 mmol/L Final     Potassium   Date Value Ref Range Status   04/07/2024 3.8 3.5 - 5.1 mmol/L Final   ]  Patient verifies taking blood pressure medications on a regular basis at the same time of the day.   Current Medications[1]  Does patient have record of home blood pressure readings no.    dose of blood pressure medication was taken at n/a.  Patient is asymptomatic.   Complains of none.    Vitals:    04/15/25 1116 04/15/25 1127   BP: (!) 152/97 (!) 152/97   BP Location: Right arm Right arm   Patient Position: Sitting Sitting   Pulse: 91 82   SpO2: 100% 100%         Dr. Nieto informed of nurse visit.          [1]   Current Outpatient Medications:     ferrous sulfate 325 (65 FE) MG EC tablet, Take 1 tablet (325 mg total) by mouth every other day., Disp: 60 tablet, Rfl: 1

## 2025-04-16 ENCOUNTER — TELEPHONE (OUTPATIENT)
Dept: PRIMARY CARE CLINIC | Facility: CLINIC | Age: 45
End: 2025-04-16
Payer: MEDICAID

## 2025-04-16 DIAGNOSIS — I10 ESSENTIAL HYPERTENSION: Primary | ICD-10-CM

## 2025-04-16 RX ORDER — AMLODIPINE BESYLATE 10 MG/1
TABLET ORAL
Qty: 90 TABLET | Refills: 3 | Status: SHIPPED | OUTPATIENT
Start: 2025-04-16

## 2025-04-16 NOTE — TELEPHONE ENCOUNTER
Please call pt and let her know that her blood pressure is high and that she has a diagnosis now of high blood pressure.  I am going to treat her with a high blood pressure medication called amlodipine.  Most patients have no side effects on this medication, however there are a small subset of people who do get leg swelling on this medication.  It happens then please let me know and we can switch her blood pressure medication.  Please schedule her for 2 week nursing blood pressure check.

## 2025-04-17 ENCOUNTER — RESULTS FOLLOW-UP (OUTPATIENT)
Dept: PRIMARY CARE CLINIC | Facility: CLINIC | Age: 45
End: 2025-04-17
Payer: MEDICAID

## 2025-04-17 DIAGNOSIS — D50.9 IRON DEFICIENCY ANEMIA, UNSPECIFIED IRON DEFICIENCY ANEMIA TYPE: Primary | ICD-10-CM

## 2025-04-30 ENCOUNTER — CLINICAL SUPPORT (OUTPATIENT)
Dept: PRIMARY CARE CLINIC | Facility: CLINIC | Age: 45
End: 2025-04-30
Payer: MEDICAID

## 2025-04-30 ENCOUNTER — OFFICE VISIT (OUTPATIENT)
Dept: OBSTETRICS AND GYNECOLOGY | Facility: CLINIC | Age: 45
End: 2025-04-30
Payer: MEDICAID

## 2025-04-30 VITALS — BODY MASS INDEX: 57.33 KG/M2 | DIASTOLIC BLOOD PRESSURE: 94 MMHG | WEIGHT: 293 LBS | SYSTOLIC BLOOD PRESSURE: 156 MMHG

## 2025-04-30 VITALS — SYSTOLIC BLOOD PRESSURE: 130 MMHG | HEART RATE: 84 BPM | DIASTOLIC BLOOD PRESSURE: 86 MMHG | OXYGEN SATURATION: 98 %

## 2025-04-30 DIAGNOSIS — N92.4 EXCESSIVE BLEEDING IN PREMENOPAUSAL PERIOD: ICD-10-CM

## 2025-04-30 DIAGNOSIS — I10 ESSENTIAL HYPERTENSION: Primary | ICD-10-CM

## 2025-04-30 DIAGNOSIS — Z01.419 ENCOUNTER FOR GYNECOLOGICAL EXAMINATION WITHOUT ABNORMAL FINDING: Primary | ICD-10-CM

## 2025-04-30 DIAGNOSIS — Z12.39 SCREENING BREAST EXAMINATION: ICD-10-CM

## 2025-04-30 PROCEDURE — 99213 OFFICE O/P EST LOW 20 MIN: CPT | Mod: PBBFAC,27

## 2025-04-30 PROCEDURE — 3077F SYST BP >= 140 MM HG: CPT | Mod: CPTII,,,

## 2025-04-30 PROCEDURE — 99386 PREV VISIT NEW AGE 40-64: CPT | Mod: S$PBB,,,

## 2025-04-30 PROCEDURE — 99212 OFFICE O/P EST SF 10 MIN: CPT | Mod: PBBFAC,PN

## 2025-04-30 PROCEDURE — 3008F BODY MASS INDEX DOCD: CPT | Mod: CPTII,,,

## 2025-04-30 PROCEDURE — 99999 PR PBB SHADOW E&M-EST. PATIENT-LVL II: CPT | Mod: PBBFAC,,,

## 2025-04-30 PROCEDURE — 87591 N.GONORRHOEAE DNA AMP PROB: CPT

## 2025-04-30 PROCEDURE — 99999 PR PBB SHADOW E&M-EST. PATIENT-LVL III: CPT | Mod: PBBFAC,,,

## 2025-04-30 PROCEDURE — 3080F DIAST BP >= 90 MM HG: CPT | Mod: CPTII,,,

## 2025-04-30 PROCEDURE — 87624 HPV HI-RISK TYP POOLED RSLT: CPT

## 2025-04-30 PROCEDURE — 1159F MED LIST DOCD IN RCRD: CPT | Mod: CPTII,,,

## 2025-04-30 PROCEDURE — 3044F HG A1C LEVEL LT 7.0%: CPT | Mod: CPTII,,,

## 2025-04-30 NOTE — PROGRESS NOTES
Refugio FELICIANO Luca 44 y.o. female is here today for Blood Pressure check.   History of HTN yes.    Review of patient's allergies indicates:  No Known Allergies  Creatinine   Date Value Ref Range Status   04/15/2025 0.8 0.5 - 1.4 mg/dL Final     Sodium   Date Value Ref Range Status   04/15/2025 140 136 - 145 mmol/L Final   04/07/2024 136 136 - 145 mmol/L Final     Potassium   Date Value Ref Range Status   04/15/2025 4.0 3.5 - 5.1 mmol/L Final   04/07/2024 3.8 3.5 - 5.1 mmol/L Final   ]  Patient verifies taking blood pressure medications on a regular basis at the same time of the day.   Current Medications[1]  Does patient have record of home blood pressure readings no. .   Last dose of blood pressure medication was taken at 8 AM.  Patient is asymptomatic.   Complains of NONE.    Vitals:    04/30/25 1328   BP: 130/86   BP Location: Right arm   Patient Position: Sitting   Pulse: 84   SpO2: 98%         Dr. Garcia informed of nurse visit.          [1]   Current Outpatient Medications:     amLODIPine (NORVASC) 10 MG tablet, Take half tablet (5 mg) daily for one week, then increase to 10mg daily for blood pressure, Disp: 90 tablet, Rfl: 3    ferrous sulfate 325 (65 FE) MG EC tablet, Take 1 tablet (325 mg total) by mouth every other day., Disp: 60 tablet, Rfl: 1

## 2025-04-30 NOTE — PROGRESS NOTES
CC: Annual  HISTORY OF PRESENT ILLNESS:    Refugio Segovia is a 44 y.o. female, , Patient's last menstrual period was 2025.,  presents for a routine exam and has complaints of prolonged cycles and menorrhagia.  Reports cycles are monthly but last from 12-14 days long.  Patient strongly leaning towards hysterectomy.  She would like to discuss procedure soon.    She is sexually active. She uses NFP for contraception.  History of STDs: denies.  She does want STD screening.  Denies any other GYN complaints.      The patient participates in regular exercise: yes (home workouts).  The patient does not smoke.  The patient wears seatbelts.   Pt denies any domestic violence. Denies  tattoos or blood transfusions    SCREENING:   Pap: none on file (done today)   Mammogram:  2023 neg, TC Score: 8.69 %   Colonoscopy: N/A   DEXA:  N/A     GYN FH:   Breast cancer: mat cousin  Colon cancer: none  Ovarian cancer: none  Endometrial cancer: none     OB History    Para Term  AB Living   0 0 0 0 0 0   SAB IAB Ectopic Multiple Live Births   0 0 0 0 0        Past Medical History:  History reviewed. No pertinent past medical history.    Past Surgical History:  Past Surgical History:   Procedure Laterality Date    BREAST CYST EXCISION Left        Family History:  Family History   Problem Relation Name Age of Onset    Heart disease Mother      Diabetes Father      Hypertension Sister      Heart disease Sister      Lupus Sister      Hypertension Maternal Grandmother      Heart disease Maternal Grandmother         Allergies:  Review of patient's allergies indicates:  No Known Allergies    Medications:  Medications Ordered Prior to Encounter[1]    Social History:  Social History[2]            ROS:   GENERAL: Feeling well overall. Denies fever or chills.   SKIN: Denies rash or lesions.   HEAD: Denies head injury or headache.   NODES: Denies enlarged lymph nodes.   CHEST: Denies chest pain or shortness of  breath.   CARDIOVASCULAR: Denies palpitations or left sided chest pain.   ABDOMEN: No abdominal pain, constipation, diarrhea, nausea, vomiting or rectal bleeding.   URINARY: No dysuria, hematuria, or burning on urination.  REPRODUCTIVE: See HPI.   BREASTS: Denies pain, lumps, or nipple discharge.   HEMATOLOGIC: No easy bruisability or excessive bleeding.   MUSCULOSKELETAL: Denies joint pain or swelling.   NEUROLOGIC: Denies syncope or weakness.   PSYCHIATRIC: Denies depression, anxiety or mood swings.    PE:   APPEARANCE: Well nourished, well developed, Black or  female in no acute distress.  NODES: no cervical, supraclavicular, or inguinal lymphadenopathy  BREASTS: Symmetrical, no skin changes or visible lesions. No palpable masses, nipple discharge or adenopathy bilaterally.  ABDOMEN: Soft. No tenderness or masses. No distention. No hernias palpated. No CVA tenderness.  VULVA: No lesions. Normal external female genitalia.  URETHRAL MEATUS: Normal size and location, no lesions, no prolapse.  URETHRA: No masses, tenderness, or prolapse.  VAGINA:  Moist. No lesions or lacerations noted. No abnormal discharge present. No odor present.   CERVIX: No lesions or discharge. No cervical motion tenderness.   UTERUS: Normal size, regular shape, mobile, non-tender.  ADNEXA: No tenderness. No fullness or masses palpated in the adnexal regions.   ANUS PERINEUM: Normal.      Diagnosis:  1. Encounter for gynecological examination without abnormal finding    2. Screening breast examination    3. Excessive bleeding in premenopausal period        Plan:     Orders Placed This Encounter    HPV High Risk Genotypes, PCR    US Pelvis Comp with Transvag NON-OB (xpd    C. trachomatis/N. gonorrhoeae by AMP DNA    Liquid-Based Pap Smear, Screening     - keep appt for BP follow up with PCP  - pt will follow up after PUS to discuss hysterectomy with OBGYN  - Self breast exams encouraged  - Pap and HPV done today.  - Screening  tests as ordered.  - Diet and exercise encouraged.  Condom use encouraged for STD prevention.  Seat belt use encouraged.  Reviewed ASCCP Pap guidelines and screening recommendations.  Calcium and vitamin D recommended.     Counseling: injury prevention: Driving under the influence of alcohol  Weapons  Seatbelts  Bicycle helmets  Adequate sleep  Exercise  Stress management techniques  indications for and frequency of periodic gynecologic exam  reviewed current Pap guidelines. Explained new understanding of natural history of cervical disease and improved Paps. Recommended guideline concordant care.    The patient was counseled today on ACS PAP guidelines, with recommendations for yearly pelvic exams unless their uterus, cervix, and ovaries were removed for benign reasons; in that case, examinations every 3-5 years are recommended.  The patient was also counseled regarding monthly breast self-examination, routine STD screening for at-risk populations, prophylactic immunizations for transmitted infections such as  HPV, Pertussis, or Influenza as appropriate, and yearly mammograms when indicated by ACS guidelines.  She was advised to see her primary care physician for all other health maintenance.    Counseling session lasted approximately 10 minutes, and all her questions were answered.    Follow-up with me in 1 year for routine exam or sooner if needed.    BENJI Castro-BC                 [1]   Current Outpatient Medications on File Prior to Visit   Medication Sig Dispense Refill    amLODIPine (NORVASC) 10 MG tablet Take half tablet (5 mg) daily for one week, then increase to 10mg daily for blood pressure 90 tablet 3    ferrous sulfate 325 (65 FE) MG EC tablet Take 1 tablet (325 mg total) by mouth every other day. 60 tablet 1     No current facility-administered medications on file prior to visit.   [2]   Social History  Tobacco Use    Smoking status: Never    Smokeless tobacco: Never   Substance Use Topics     Alcohol use: Never    Drug use: Never

## 2025-05-01 LAB
C TRACH DNA SPEC QL NAA+PROBE: NOT DETECTED
CTGC SOURCE (OHS) ORD-325: NORMAL
N GONORRHOEA DNA UR QL NAA+PROBE: NOT DETECTED

## 2025-05-12 ENCOUNTER — RESULTS FOLLOW-UP (OUTPATIENT)
Dept: OBSTETRICS AND GYNECOLOGY | Facility: CLINIC | Age: 45
End: 2025-05-12

## 2025-05-21 ENCOUNTER — PATIENT OUTREACH (OUTPATIENT)
Dept: ADMINISTRATIVE | Facility: HOSPITAL | Age: 45
End: 2025-05-21
Payer: MEDICAID

## 2025-05-21 DIAGNOSIS — Z12.11 ENCOUNTER FOR COLORECTAL CANCER SCREENING: Primary | ICD-10-CM

## 2025-05-21 DIAGNOSIS — Z12.12 ENCOUNTER FOR COLORECTAL CANCER SCREENING: Primary | ICD-10-CM

## 2025-05-22 ENCOUNTER — TELEPHONE (OUTPATIENT)
Dept: ENDOSCOPY | Facility: HOSPITAL | Age: 45
End: 2025-05-22

## 2025-05-22 ENCOUNTER — CLINICAL SUPPORT (OUTPATIENT)
Dept: ENDOSCOPY | Facility: HOSPITAL | Age: 45
End: 2025-05-22
Payer: MEDICAID

## 2025-05-22 DIAGNOSIS — Z12.12 ENCOUNTER FOR COLORECTAL CANCER SCREENING: ICD-10-CM

## 2025-05-22 DIAGNOSIS — Z12.11 ENCOUNTER FOR COLORECTAL CANCER SCREENING: ICD-10-CM

## 2025-06-02 ENCOUNTER — OFFICE VISIT (OUTPATIENT)
Dept: PRIMARY CARE CLINIC | Facility: CLINIC | Age: 45
End: 2025-06-02
Payer: MEDICAID

## 2025-06-02 ENCOUNTER — OFFICE VISIT (OUTPATIENT)
Dept: OBSTETRICS AND GYNECOLOGY | Facility: CLINIC | Age: 45
End: 2025-06-02
Payer: MEDICAID

## 2025-06-02 VITALS
BODY MASS INDEX: 50.02 KG/M2 | HEIGHT: 64 IN | DIASTOLIC BLOOD PRESSURE: 88 MMHG | WEIGHT: 293 LBS | SYSTOLIC BLOOD PRESSURE: 126 MMHG

## 2025-06-02 VITALS
WEIGHT: 293 LBS | BODY MASS INDEX: 50.02 KG/M2 | HEART RATE: 106 BPM | HEIGHT: 64 IN | DIASTOLIC BLOOD PRESSURE: 83 MMHG | OXYGEN SATURATION: 97 % | SYSTOLIC BLOOD PRESSURE: 124 MMHG | TEMPERATURE: 98 F

## 2025-06-02 DIAGNOSIS — N92.4 EXCESSIVE BLEEDING IN PREMENOPAUSAL PERIOD: ICD-10-CM

## 2025-06-02 DIAGNOSIS — N93.9 ABNORMAL UTERINE BLEEDING (AUB): Primary | ICD-10-CM

## 2025-06-02 DIAGNOSIS — D50.9 IRON DEFICIENCY ANEMIA, UNSPECIFIED IRON DEFICIENCY ANEMIA TYPE: Primary | ICD-10-CM

## 2025-06-02 DIAGNOSIS — K59.00 CONSTIPATION, UNSPECIFIED CONSTIPATION TYPE: ICD-10-CM

## 2025-06-02 DIAGNOSIS — D50.9 MICROCYTIC ANEMIA: ICD-10-CM

## 2025-06-02 DIAGNOSIS — Z12.11 COLON CANCER SCREENING: ICD-10-CM

## 2025-06-02 DIAGNOSIS — Z12.31 SCREENING MAMMOGRAM, ENCOUNTER FOR: ICD-10-CM

## 2025-06-02 PROCEDURE — 99213 OFFICE O/P EST LOW 20 MIN: CPT | Mod: PBBFAC | Performed by: STUDENT IN AN ORGANIZED HEALTH CARE EDUCATION/TRAINING PROGRAM

## 2025-06-02 PROCEDURE — 3074F SYST BP LT 130 MM HG: CPT | Mod: CPTII,,, | Performed by: STUDENT IN AN ORGANIZED HEALTH CARE EDUCATION/TRAINING PROGRAM

## 2025-06-02 PROCEDURE — 3079F DIAST BP 80-89 MM HG: CPT | Mod: CPTII,,, | Performed by: STUDENT IN AN ORGANIZED HEALTH CARE EDUCATION/TRAINING PROGRAM

## 2025-06-02 PROCEDURE — 1159F MED LIST DOCD IN RCRD: CPT | Mod: CPTII,,, | Performed by: STUDENT IN AN ORGANIZED HEALTH CARE EDUCATION/TRAINING PROGRAM

## 2025-06-02 PROCEDURE — 99999 PR PBB SHADOW E&M-EST. PATIENT-LVL III: CPT | Mod: PBBFAC,,, | Performed by: STUDENT IN AN ORGANIZED HEALTH CARE EDUCATION/TRAINING PROGRAM

## 2025-06-02 PROCEDURE — 3074F SYST BP LT 130 MM HG: CPT | Mod: CPTII,,, | Performed by: FAMILY MEDICINE

## 2025-06-02 PROCEDURE — 1160F RVW MEDS BY RX/DR IN RCRD: CPT | Mod: CPTII,,, | Performed by: STUDENT IN AN ORGANIZED HEALTH CARE EDUCATION/TRAINING PROGRAM

## 2025-06-02 PROCEDURE — 3044F HG A1C LEVEL LT 7.0%: CPT | Mod: CPTII,,, | Performed by: STUDENT IN AN ORGANIZED HEALTH CARE EDUCATION/TRAINING PROGRAM

## 2025-06-02 PROCEDURE — 99999 PR PBB SHADOW E&M-EST. PATIENT-LVL III: CPT | Mod: PBBFAC,,, | Performed by: FAMILY MEDICINE

## 2025-06-02 PROCEDURE — 3008F BODY MASS INDEX DOCD: CPT | Mod: CPTII,,, | Performed by: FAMILY MEDICINE

## 2025-06-02 PROCEDURE — 99213 OFFICE O/P EST LOW 20 MIN: CPT | Mod: PBBFAC,27,PN | Performed by: FAMILY MEDICINE

## 2025-06-02 PROCEDURE — 99214 OFFICE O/P EST MOD 30 MIN: CPT | Mod: S$PBB,,, | Performed by: STUDENT IN AN ORGANIZED HEALTH CARE EDUCATION/TRAINING PROGRAM

## 2025-06-02 PROCEDURE — 3079F DIAST BP 80-89 MM HG: CPT | Mod: CPTII,,, | Performed by: FAMILY MEDICINE

## 2025-06-02 PROCEDURE — 3044F HG A1C LEVEL LT 7.0%: CPT | Mod: CPTII,,, | Performed by: FAMILY MEDICINE

## 2025-06-02 PROCEDURE — 99214 OFFICE O/P EST MOD 30 MIN: CPT | Mod: S$PBB,,, | Performed by: FAMILY MEDICINE

## 2025-06-02 PROCEDURE — 3008F BODY MASS INDEX DOCD: CPT | Mod: CPTII,,, | Performed by: STUDENT IN AN ORGANIZED HEALTH CARE EDUCATION/TRAINING PROGRAM

## 2025-06-02 RX ORDER — DOCUSATE SODIUM 100 MG/1
100 CAPSULE, LIQUID FILLED ORAL DAILY
Qty: 30 CAPSULE | Refills: 0 | Status: SHIPPED | OUTPATIENT
Start: 2025-06-02

## 2025-06-02 RX ORDER — NAPROXEN 500 MG/1
500 TABLET ORAL 2 TIMES DAILY PRN
Qty: 60 TABLET | Refills: 3 | Status: SHIPPED | OUTPATIENT
Start: 2025-06-02

## 2025-06-18 ENCOUNTER — CLINICAL SUPPORT (OUTPATIENT)
Dept: ENDOSCOPY | Facility: HOSPITAL | Age: 45
End: 2025-06-18
Payer: MEDICAID

## 2025-06-18 VITALS — HEIGHT: 64 IN | BODY MASS INDEX: 50.02 KG/M2 | WEIGHT: 293 LBS

## 2025-06-18 DIAGNOSIS — Z12.11 ENCOUNTER FOR COLORECTAL CANCER SCREENING: ICD-10-CM

## 2025-06-18 DIAGNOSIS — Z12.12 ENCOUNTER FOR COLORECTAL CANCER SCREENING: ICD-10-CM

## 2025-06-18 NOTE — PATIENT INSTRUCTIONS
Colonoscopy Procedure Prep Instructions      Date of procedure: 07/10/2025 Arrive at: 1:30PM    Location of Department:   [facility] [address] [place] [address]  Take the [facility] Elevators to 4th Floor Endoscopy Lab    As soon as possible:   your prep from pharmacy and over the counter DULCOLAX LAXATIVE TABLETS     On the day before your procedure   What You CAN do:   You may have clear liquids ONLY -see below for list.     Liquids That Are OK to Drink:   Water  Sports drinks (Gatorade, Power-Aid)  Coffee or tea (no cream or nondairy creamer)  Clear juices without pulp (apple, white grape)  Gelatin desserts (no fruit or toppings)  Clear soda (sprite, coke, ginger ale)  Chicken broth (until 12 midnight the night before procedure)      What You CANNOT do:   Do not EAT solid food, drink milk or anything   colored red.  Do not drink alcohol.  Do not take oral medications within 1 hour of starting   each dose of SUPREP.  No gum chewing or candy morning of procedure.      Note:   (Please disregard the insert instructions from pharmacy).  SUPREP Bowel Prep Kit is indicated for cleansing of the colon as a preparation for colonoscopy in adults.   Be sure to tell your doctor about all the medicines you take, including prescription and non-prescription medicines, vitamins, and herbal supplements. SUPREP Bowel Prep Kit may affect how other medicines work.  Medication taken by mouth may not be absorbed properly when taken within 1 hour before the start of each dose of SUPREP Bowel Prep Kit.    It is not uncommon to experience some abdominal cramping, nausea and/or vomiting when taking the prep. If you have nausea and/or vomiting while taking the prep, stop drinking for 20 to 30 minutes then continue.    How to take prep:    SUPREP Bowel Prep Kit is a (2-day) prep.   Both 6-ounce bottles are required for a complete preparation for colonoscopy. Dilute the solution concentrate as directed  prior to use. You must drink water with each dose of SUPREP, and additional water after each dose.    DOSE 1--Day Before Colonoscopy 07/09/2025    Drink at least 6 to 8 glasses of clear liquids from time you wake up until you begin your prep and then continue until bedtime to avoid dehydration.     12:00 pm (NOON) Take four (4) Dulcolax (Bisacodyl) tablets with at least 8 ounces or more of clear liquids.      6:00 pm:    You must complete Steps 1 through 4 using one (1) 6-ounce bottle before going to bed as shown below:    Step 1-Pour ONE (1) 6-ounce bottle of SUPREP liquid into the mixing container.  Step 2-Add cool drinking water to the 16-ounce line on the container and mix.  Step 3-Drink ALL the liquid in the container.  Step 4-You must drink two (2) more 16-ounce containers of water over the next 1 hour.  IMPORTANT: If you experience preparation-related symptoms (for example, nausea, bloating, or cramping), stop, or slow the rate of drinking the additional water until your symptoms decrease.    DOSE 2--Day of the Colonoscopy 07/10/2025 at 2-3 AM.    For this dose, repeat Steps 1 through 4 shown above using the other 6-ounce bottle.   You may continue drinking water/clear liquids until   4 hours before your colonoscopy or as directed by the scheduling nurse  10:00AM.    For information about your procedure, two (2) things to view prior to colonoscopy:  Please watch this informational video. It is important to watch this animated consent video prior to your arrival. If you haven't watched the video prior to arriving, you are required to watch it during admission which can causes delays.    Options for viewing:   Using a keyboard:  press and hold the control tab (Ctrl) and left mouse click to follow links.           Colonoscopy Instructional Video                                                                                   OR    Type link address into your web browser's address  bar:  [email or web address]      Educational Booklet with pictures:      Colonoscopy Prep - Liquid      Comments:            IMPORTANT INFORMATION TO KNOW BEFORE YOUR PROCEDURE    [facility] 4th Floor         If your procedure requires the administration of anesthesia, it is necessary for a responsible adult to drive you home. (Medical Transportation, Uber, Lyft, Taxi, etc. may ONLY be used if a responsible adult is present to accompany you home.  The responsible adult CAN'T be the  of the service).      person must be available to return to pick you up within 15 minutes of being notified of discharge.       Please bring a picture ID, insurance card, & copayment      Take Medications as directed below:        If you begin taking any blood thinning medications, injectable weight loss/diabetes medications (other than insulin) , Adipex (Phentermine) , please contact the endoscopy scheduling department listed below as soon as possible.    If you are diabetic see the attached instruction sheet regarding your medication.     If you take HEART, BLOOD PRESSURE, SEIZURE, PAIN, LUNG (including inhalers/nebulizers), ANTI-REJECTION (transplant patients), or PSYCHIATRIC medications, please take at your regular times with a sip of water or as directed by the scheduling nurse.     Important contact information:    Endoscopy [phone] Hours of operation Monday-Friday 8:00-4:30pm.    Questions about insurance or financial obligations call [phone] or [phone].    If you have questions regarding the prep or need to reschedule, please call [phone]. After hours questions requiring immediate assistance, contact [facility] nurse line at [phone] or [phone].   NOTE:     On occasion, unforeseen circumstances may cause a delay in your procedure start time. We respect your time and appreciate your patience during these  circumstances.      Comments:

## 2025-07-02 ENCOUNTER — RESULTS FOLLOW-UP (OUTPATIENT)
Dept: PRIMARY CARE CLINIC | Facility: CLINIC | Age: 45
End: 2025-07-02

## 2025-07-02 RX ORDER — FERROUS SULFATE 325(65) MG
325 TABLET, DELAYED RELEASE (ENTERIC COATED) ORAL EVERY OTHER DAY
Qty: 60 TABLET | Refills: 1 | Status: SHIPPED | OUTPATIENT
Start: 2025-07-02

## 2025-07-14 ENCOUNTER — PATIENT OUTREACH (OUTPATIENT)
Dept: ADMINISTRATIVE | Facility: HOSPITAL | Age: 45
End: 2025-07-14
Payer: MEDICAID

## 2025-07-14 NOTE — PROGRESS NOTES
Health Maintenance Due   Topic Date Due    Pneumococcal Vaccines (Age 0-49) (1 of 2 - PCV) Never done    Colorectal Cancer Screening  Never done    Mammogram  06/12/2024    COVID-19 Vaccine (3 - 2024-25 season) 09/01/2024    TETANUS VACCINE  12/11/2024     Spoke with Ms. Huff she is waiting for scheduling to call with an open date for colonoscopy at either [facility] or [facility]  Mammogram scheduled

## 2025-07-22 ENCOUNTER — HOSPITAL ENCOUNTER (OUTPATIENT)
Dept: RADIOLOGY | Facility: HOSPITAL | Age: 45
Discharge: HOME | End: 2025-07-22
Attending: FAMILY MEDICINE
Payer: MEDICAID

## 2025-07-22 VITALS — HEIGHT: 64 IN | BODY MASS INDEX: 50.02 KG/M2 | WEIGHT: 293 LBS

## 2025-07-22 DIAGNOSIS — Z12.31 ENCOUNTER FOR SCREENING MAMMOGRAM FOR BREAST CANCER: ICD-10-CM

## 2025-07-22 PROCEDURE — 77063 BREAST TOMOSYNTHESIS BI: CPT | Mod: TC

## 2025-08-05 ENCOUNTER — TELEPHONE (OUTPATIENT)
Dept: ENDOSCOPY | Facility: HOSPITAL | Age: 45
End: 2025-08-05
Payer: MEDICAID

## 2025-08-05 VITALS — BODY MASS INDEX: 50.02 KG/M2 | WEIGHT: 293 LBS | HEIGHT: 64 IN

## 2025-08-05 DIAGNOSIS — Z12.11 SPECIAL SCREENING FOR MALIGNANT NEOPLASMS, COLON: Primary | ICD-10-CM

## 2025-08-05 RX ORDER — SODIUM, POTASSIUM,MAG SULFATES 17.5-3.13G
1 SOLUTION, RECONSTITUTED, ORAL ORAL DAILY
Qty: 1 KIT | Refills: 0 | Status: SHIPPED | OUTPATIENT
Start: 2025-09-26 | End: 2025-09-28

## 2025-08-05 NOTE — TELEPHONE ENCOUNTER
Patient is scheduled for a Colonoscopy on 10/03/2025 with Dr. FAHEEM Lopez  Referral for procedure from [facility]

## 2025-09-02 ENCOUNTER — OFFICE VISIT (OUTPATIENT)
Dept: PRIMARY CARE CLINIC | Facility: CLINIC | Age: 45
End: 2025-09-02
Payer: MEDICAID

## 2025-09-02 VITALS
SYSTOLIC BLOOD PRESSURE: 125 MMHG | OXYGEN SATURATION: 100 % | DIASTOLIC BLOOD PRESSURE: 85 MMHG | BODY MASS INDEX: 50.02 KG/M2 | HEIGHT: 64 IN | TEMPERATURE: 99 F | HEART RATE: 83 BPM | WEIGHT: 293 LBS

## 2025-09-02 DIAGNOSIS — E66.813 CLASS 3 SEVERE OBESITY WITH BODY MASS INDEX (BMI) OF 50.0 TO 59.9 IN ADULT, UNSPECIFIED OBESITY TYPE, UNSPECIFIED WHETHER SERIOUS COMORBIDITY PRESENT: ICD-10-CM

## 2025-09-02 DIAGNOSIS — D50.9 MICROCYTIC ANEMIA: ICD-10-CM

## 2025-09-02 DIAGNOSIS — D50.9 IRON DEFICIENCY ANEMIA, UNSPECIFIED IRON DEFICIENCY ANEMIA TYPE: ICD-10-CM

## 2025-09-02 DIAGNOSIS — N92.4 EXCESSIVE BLEEDING IN PREMENOPAUSAL PERIOD: Primary | ICD-10-CM

## 2025-09-02 PROCEDURE — 1159F MED LIST DOCD IN RCRD: CPT | Mod: CPTII,,, | Performed by: FAMILY MEDICINE

## 2025-09-02 PROCEDURE — 3008F BODY MASS INDEX DOCD: CPT | Mod: CPTII,,, | Performed by: FAMILY MEDICINE

## 2025-09-02 PROCEDURE — 3079F DIAST BP 80-89 MM HG: CPT | Mod: CPTII,,, | Performed by: FAMILY MEDICINE

## 2025-09-02 PROCEDURE — 99213 OFFICE O/P EST LOW 20 MIN: CPT | Mod: S$PBB,,, | Performed by: FAMILY MEDICINE

## 2025-09-02 PROCEDURE — 99213 OFFICE O/P EST LOW 20 MIN: CPT | Mod: PBBFAC,PN | Performed by: FAMILY MEDICINE

## 2025-09-02 PROCEDURE — 99999 PR PBB SHADOW E&M-EST. PATIENT-LVL III: CPT | Mod: PBBFAC,,, | Performed by: FAMILY MEDICINE

## 2025-09-02 PROCEDURE — 3074F SYST BP LT 130 MM HG: CPT | Mod: CPTII,,, | Performed by: FAMILY MEDICINE

## 2025-09-02 PROCEDURE — 3044F HG A1C LEVEL LT 7.0%: CPT | Mod: CPTII,,, | Performed by: FAMILY MEDICINE
